# Patient Record
Sex: MALE | Race: WHITE | NOT HISPANIC OR LATINO | Employment: OTHER | URBAN - METROPOLITAN AREA
[De-identification: names, ages, dates, MRNs, and addresses within clinical notes are randomized per-mention and may not be internally consistent; named-entity substitution may affect disease eponyms.]

---

## 2023-05-13 LAB
ALANINE AMINOTRANSFERASE (SGPT) (U/L) IN SER/PLAS: 17 U/L (ref 10–52)
ALBUMIN (G/DL) IN SER/PLAS: 3.8 G/DL (ref 3.4–5)
ALKALINE PHOSPHATASE (U/L) IN SER/PLAS: 61 U/L (ref 33–136)
AMPHETAMINE (PRESENCE) IN URINE BY SCREEN METHOD: ABNORMAL
ANION GAP IN SER/PLAS: 13 MMOL/L (ref 10–20)
ASPARTATE AMINOTRANSFERASE (SGOT) (U/L) IN SER/PLAS: 19 U/L (ref 9–39)
BARBITURATES PRESENCE IN URINE BY SCREEN METHOD: ABNORMAL
BASOPHILS (10*3/UL) IN BLOOD BY AUTOMATED COUNT: 0.07 X10E9/L (ref 0–0.1)
BASOPHILS/100 LEUKOCYTES IN BLOOD BY AUTOMATED COUNT: 0.6 % (ref 0–2)
BENZODIAZEPINE (PRESENCE) IN URINE BY SCREEN METHOD: ABNORMAL
BILIRUBIN TOTAL (MG/DL) IN SER/PLAS: 0.9 MG/DL (ref 0–1.2)
CALCIDIOL (25 OH VITAMIN D3) (NG/ML) IN SER/PLAS: 32 NG/ML
CALCIUM (MG/DL) IN SER/PLAS: 8.9 MG/DL (ref 8.6–10.3)
CANNABINOIDS IN URINE BY SCREEN METHOD: ABNORMAL
CARBON DIOXIDE, TOTAL (MMOL/L) IN SER/PLAS: 28 MMOL/L (ref 21–32)
CHLORIDE (MMOL/L) IN SER/PLAS: 101 MMOL/L (ref 98–107)
CHOLESTEROL (MG/DL) IN SER/PLAS: 114 MG/DL (ref 0–199)
CHOLESTEROL IN HDL (MG/DL) IN SER/PLAS: 52.3 MG/DL
CHOLESTEROL/HDL RATIO: 2.2
COCAINE (PRESENCE) IN URINE BY SCREEN METHOD: ABNORMAL
CREATININE (MG/DL) IN SER/PLAS: 0.81 MG/DL (ref 0.5–1.3)
DRUG SCREEN COMMENT URINE: ABNORMAL
EOSINOPHILS (10*3/UL) IN BLOOD BY AUTOMATED COUNT: 0.2 X10E9/L (ref 0–0.4)
EOSINOPHILS/100 LEUKOCYTES IN BLOOD BY AUTOMATED COUNT: 1.8 % (ref 0–6)
ERYTHROCYTE DISTRIBUTION WIDTH (RATIO) BY AUTOMATED COUNT: 14.6 % (ref 11.5–14.5)
ERYTHROCYTE MEAN CORPUSCULAR HEMOGLOBIN CONCENTRATION (G/DL) BY AUTOMATED: 32.1 G/DL (ref 32–36)
ERYTHROCYTE MEAN CORPUSCULAR VOLUME (FL) BY AUTOMATED COUNT: 95 FL (ref 80–100)
ERYTHROCYTES (10*6/UL) IN BLOOD BY AUTOMATED COUNT: 4.02 X10E12/L (ref 4.5–5.9)
FENTANYL URINE: ABNORMAL
GFR MALE: 89 ML/MIN/1.73M2
GLUCOSE (MG/DL) IN SER/PLAS: 96 MG/DL (ref 74–99)
HEMATOCRIT (%) IN BLOOD BY AUTOMATED COUNT: 38 % (ref 41–52)
HEMOGLOBIN (G/DL) IN BLOOD: 12.2 G/DL (ref 13.5–17.5)
IMMATURE GRANULOCYTES/100 LEUKOCYTES IN BLOOD BY AUTOMATED COUNT: 0.5 % (ref 0–0.9)
LDL: 51 MG/DL (ref 0–99)
LEUKOCYTES (10*3/UL) IN BLOOD BY AUTOMATED COUNT: 11.4 X10E9/L (ref 4.4–11.3)
LYMPHOCYTES (10*3/UL) IN BLOOD BY AUTOMATED COUNT: 2.42 X10E9/L (ref 0.8–3)
LYMPHOCYTES/100 LEUKOCYTES IN BLOOD BY AUTOMATED COUNT: 21.2 % (ref 13–44)
METHADONE (PRESENCE) IN URINE BY SCREEN METHOD: ABNORMAL
MONOCYTES (10*3/UL) IN BLOOD BY AUTOMATED COUNT: 1.05 X10E9/L (ref 0.05–0.8)
MONOCYTES/100 LEUKOCYTES IN BLOOD BY AUTOMATED COUNT: 9.2 % (ref 2–10)
NEUTROPHILS (10*3/UL) IN BLOOD BY AUTOMATED COUNT: 7.62 X10E9/L (ref 1.6–5.5)
NEUTROPHILS/100 LEUKOCYTES IN BLOOD BY AUTOMATED COUNT: 66.7 % (ref 40–80)
OPIATES (PRESENCE) IN URINE BY SCREEN METHOD: ABNORMAL
OXYCODONE (PRESENCE) IN URINE BY SCREEN METHOD: ABNORMAL
PHENCYCLIDINE (PRESENCE) IN URINE BY SCREEN METHOD: ABNORMAL
PLATELETS (10*3/UL) IN BLOOD AUTOMATED COUNT: 292 X10E9/L (ref 150–450)
POTASSIUM (MMOL/L) IN SER/PLAS: 3.9 MMOL/L (ref 3.5–5.3)
PROTEIN TOTAL: 7.2 G/DL (ref 6.4–8.2)
SODIUM (MMOL/L) IN SER/PLAS: 138 MMOL/L (ref 136–145)
THYROTROPIN (MIU/L) IN SER/PLAS BY DETECTION LIMIT <= 0.05 MIU/L: 2.23 MIU/L (ref 0.44–3.98)
TRIGLYCERIDE (MG/DL) IN SER/PLAS: 56 MG/DL (ref 0–149)
UREA NITROGEN (MG/DL) IN SER/PLAS: 15 MG/DL (ref 6–23)
VLDL: 11 MG/DL (ref 0–40)

## 2023-05-14 LAB
ESTIMATED AVERAGE GLUCOSE FOR HBA1C: 120 MG/DL
HEMOGLOBIN A1C/HEMOGLOBIN TOTAL IN BLOOD: 5.8 %

## 2023-05-19 LAB
6-ACETYLMORPHINE: <25 NG/ML
CODEINE: <50 NG/ML
HYDROCODONE: <25 NG/ML
HYDROMORPHONE: <25 NG/ML
MORPHINE URINE: <50 NG/ML
NORHYDROCODONE: <25 NG/ML
NOROXYCODONE: 37 NG/ML
OXYCODONE: <25 NG/ML
OXYMORPHONE: 101 NG/ML

## 2023-10-05 ENCOUNTER — LAB (OUTPATIENT)
Dept: LAB | Facility: LAB | Age: 83
End: 2023-10-05
Payer: MEDICARE

## 2023-10-05 DIAGNOSIS — I25.10 ATHEROSCLEROTIC HEART DISEASE OF NATIVE CORONARY ARTERY WITHOUT ANGINA PECTORIS: ICD-10-CM

## 2023-10-05 DIAGNOSIS — J43.9 PULMONARY EMPHYSEMA, UNSPECIFIED EMPHYSEMA TYPE (MULTI): Primary | ICD-10-CM

## 2023-10-05 DIAGNOSIS — D64.9 ANEMIA, UNSPECIFIED TYPE: ICD-10-CM

## 2023-10-05 DIAGNOSIS — R73.09 OTHER ABNORMAL GLUCOSE: Primary | ICD-10-CM

## 2023-10-05 DIAGNOSIS — E55.9 VITAMIN D DEFICIENCY, UNSPECIFIED: ICD-10-CM

## 2023-10-05 DIAGNOSIS — D64.9 ANEMIA, UNSPECIFIED: ICD-10-CM

## 2023-10-05 LAB
25(OH)D3 SERPL-MCNC: 33 NG/ML (ref 30–100)
BASOPHILS # BLD AUTO: 0.07 X10*3/UL (ref 0–0.1)
BASOPHILS NFR BLD AUTO: 0.5 %
CHOLEST SERPL-MCNC: 101 MG/DL (ref 0–199)
CHOLESTEROL/HDL RATIO: 2.2
EOSINOPHIL # BLD AUTO: 0.23 X10*3/UL (ref 0–0.4)
EOSINOPHIL NFR BLD AUTO: 1.7 %
ERYTHROCYTE [DISTWIDTH] IN BLOOD BY AUTOMATED COUNT: 13.2 % (ref 11.5–14.5)
EST. AVERAGE GLUCOSE BLD GHB EST-MCNC: 123 MG/DL
HBA1C MFR BLD: 5.9 %
HCT VFR BLD AUTO: 44.1 % (ref 41–52)
HDLC SERPL-MCNC: 45.6 MG/DL
HGB BLD-MCNC: 15.1 G/DL (ref 13.5–17.5)
IMM GRANULOCYTES # BLD AUTO: 0.06 X10*3/UL (ref 0–0.5)
IMM GRANULOCYTES NFR BLD AUTO: 0.4 % (ref 0–0.9)
LDLC SERPL CALC-MCNC: 37 MG/DL (ref 140–190)
LYMPHOCYTES # BLD AUTO: 4.35 X10*3/UL (ref 0.8–3)
LYMPHOCYTES NFR BLD AUTO: 32 %
MCH RBC QN AUTO: 31.5 PG (ref 26–34)
MCHC RBC AUTO-ENTMCNC: 34.2 G/DL (ref 32–36)
MCV RBC AUTO: 92 FL (ref 80–100)
MONOCYTES # BLD AUTO: 1.57 X10*3/UL (ref 0.05–0.8)
MONOCYTES NFR BLD AUTO: 11.5 %
NEUTROPHILS # BLD AUTO: 7.33 X10*3/UL (ref 1.6–5.5)
NEUTROPHILS NFR BLD AUTO: 53.9 %
NON HDL CHOLESTEROL: 55 MG/DL (ref 0–149)
NRBC BLD-RTO: 0 /100 WBCS (ref 0–0)
PLATELET # BLD AUTO: 226 X10*3/UL (ref 150–450)
PMV BLD AUTO: 10.6 FL (ref 7.5–11.5)
RBC # BLD AUTO: 4.8 X10*6/UL (ref 4.5–5.9)
TRIGL SERPL-MCNC: 90 MG/DL (ref 0–149)
VLDL: 18 MG/DL (ref 0–40)
WBC # BLD AUTO: 13.6 X10*3/UL (ref 4.4–11.3)

## 2023-10-05 PROCEDURE — 85025 COMPLETE CBC W/AUTO DIFF WBC: CPT

## 2023-10-05 PROCEDURE — 83036 HEMOGLOBIN GLYCOSYLATED A1C: CPT

## 2023-10-05 PROCEDURE — 80061 LIPID PANEL: CPT

## 2023-10-05 PROCEDURE — 82306 VITAMIN D 25 HYDROXY: CPT

## 2023-10-05 PROCEDURE — 36415 COLL VENOUS BLD VENIPUNCTURE: CPT

## 2023-10-05 NOTE — RESULT ENCOUNTER NOTE
Labs acceptable but there are a few abnormalities similar to previous labs.  Please advise patient to continue the same medications and follow-up to review in detail and discuss the management options.

## 2023-10-06 DIAGNOSIS — J43.9 PULMONARY EMPHYSEMA, UNSPECIFIED EMPHYSEMA TYPE (MULTI): ICD-10-CM

## 2023-10-06 RX ORDER — FERROUS SULFATE TAB 325 MG (65 MG ELEMENTAL FE) 325 (65 FE) MG
1 TAB ORAL DAILY
Qty: 90 TABLET | Refills: 1 | Status: SHIPPED | OUTPATIENT
Start: 2023-10-06

## 2023-10-06 RX ORDER — FLUTICASONE PROPIONATE AND SALMETEROL 250; 50 UG/1; UG/1
1 POWDER RESPIRATORY (INHALATION) EVERY 12 HOURS
Qty: 60 EACH | Refills: 1 | Status: SHIPPED | OUTPATIENT
Start: 2023-10-06 | End: 2023-10-09

## 2023-10-09 RX ORDER — FLUTICASONE PROPIONATE AND SALMETEROL 250; 50 UG/1; UG/1
1 POWDER RESPIRATORY (INHALATION) EVERY 12 HOURS
Qty: 180 EACH | Refills: 1 | Status: SHIPPED | OUTPATIENT
Start: 2023-10-09

## 2023-10-11 PROBLEM — M47.812 CERVICAL OSTEOARTHRITIS: Status: ACTIVE | Noted: 2023-10-11

## 2023-10-11 PROBLEM — L91.8 SKIN TAG: Status: ACTIVE | Noted: 2023-10-11

## 2023-10-11 PROBLEM — E55.9 VITAMIN D DEFICIENCY: Status: ACTIVE | Noted: 2023-10-11

## 2023-10-11 PROBLEM — D62 ACUTE BLOOD LOSS ANEMIA: Status: ACTIVE | Noted: 2021-06-01

## 2023-10-11 PROBLEM — R68.81 EARLY SATIETY: Status: ACTIVE | Noted: 2023-10-11

## 2023-10-11 PROBLEM — R63.4 WEIGHT LOSS, UNINTENTIONAL: Status: ACTIVE | Noted: 2023-10-11

## 2023-10-11 PROBLEM — M79.671 ISCHEMIC PAIN OF RIGHT FOOT: Status: ACTIVE | Noted: 2023-10-11

## 2023-10-11 PROBLEM — I70.219: Status: ACTIVE | Noted: 2019-07-23

## 2023-10-11 PROBLEM — R73.9 HYPERGLYCEMIA: Status: ACTIVE | Noted: 2023-10-11

## 2023-10-11 PROBLEM — I42.9 CARDIOMYOPATHY (MULTI): Status: ACTIVE | Noted: 2023-10-11

## 2023-10-11 PROBLEM — I99.8 ISCHEMIC PAIN OF RIGHT FOOT: Status: ACTIVE | Noted: 2023-10-11

## 2023-10-11 PROBLEM — I25.84 CORONARY ARTERY CALCIFICATION: Status: ACTIVE | Noted: 2023-10-11

## 2023-10-11 PROBLEM — M25.551 RIGHT HIP PAIN: Status: ACTIVE | Noted: 2020-05-05

## 2023-10-11 PROBLEM — I65.23 BILATERAL CAROTID ARTERY STENOSIS: Status: ACTIVE | Noted: 2023-04-19

## 2023-10-11 PROBLEM — R07.89 CHEST DISCOMFORT: Status: ACTIVE | Noted: 2022-12-12

## 2023-10-11 PROBLEM — R73.09 ELEVATED HEMOGLOBIN A1C: Status: ACTIVE | Noted: 2023-10-11

## 2023-10-11 PROBLEM — G89.29 CHRONIC LOWER LIMB PAIN: Status: ACTIVE | Noted: 2023-10-11

## 2023-10-11 PROBLEM — M79.606 CHRONIC LOWER LIMB PAIN: Status: ACTIVE | Noted: 2023-10-11

## 2023-10-11 PROBLEM — M85.80 OSTEOPENIA: Status: ACTIVE | Noted: 2023-10-11

## 2023-10-11 PROBLEM — I10 ESSENTIAL HYPERTENSION, BENIGN: Status: ACTIVE | Noted: 2023-10-11

## 2023-10-11 PROBLEM — I73.9 PVD (PERIPHERAL VASCULAR DISEASE) (CMS-HCC): Status: ACTIVE | Noted: 2023-10-11

## 2023-10-11 PROBLEM — I72.4: Status: ACTIVE | Noted: 2023-04-26

## 2023-10-11 PROBLEM — K75.3 GRANULOMA OF LIVER: Status: ACTIVE | Noted: 2023-10-11

## 2023-10-11 PROBLEM — Z95.1 S/P CABG (CORONARY ARTERY BYPASS GRAFT): Status: RESOLVED | Noted: 2023-01-09 | Resolved: 2023-10-11

## 2023-10-11 PROBLEM — I25.10 CORONARY ARTERY CALCIFICATION: Status: ACTIVE | Noted: 2023-10-11

## 2023-10-11 PROBLEM — I70.90 ARTERIOSCLEROSIS OBLITERANS: Status: ACTIVE | Noted: 2023-10-11

## 2023-10-11 PROBLEM — S88.119A AMPUTATION BELOW KNEE (MULTI): Status: ACTIVE | Noted: 2023-10-11

## 2023-10-11 PROBLEM — G89.18 POST-OPERATIVE PAIN: Status: ACTIVE | Noted: 2023-04-27

## 2023-10-11 RX ORDER — METOPROLOL TARTRATE 25 MG/1
25 TABLET, FILM COATED ORAL 2 TIMES DAILY
COMMUNITY
End: 2023-10-12 | Stop reason: SDUPTHER

## 2023-10-11 RX ORDER — POLYETHYLENE GLYCOL 3350 17 G/17G
17 POWDER, FOR SOLUTION ORAL
COMMUNITY
Start: 2021-06-02 | End: 2023-10-12 | Stop reason: ALTCHOICE

## 2023-10-11 RX ORDER — METOPROLOL SUCCINATE 100 MG/1
1 TABLET, EXTENDED RELEASE ORAL DAILY
COMMUNITY
Start: 2021-06-23 | End: 2023-10-12 | Stop reason: SDUPTHER

## 2023-10-11 RX ORDER — DOCUSATE SODIUM 100 MG/1
100 CAPSULE, LIQUID FILLED ORAL 2 TIMES DAILY
COMMUNITY
End: 2023-12-22 | Stop reason: SDUPTHER

## 2023-10-11 RX ORDER — CEPHALEXIN 500 MG/1
500 CAPSULE ORAL 3 TIMES DAILY
COMMUNITY
Start: 2023-05-15 | End: 2023-10-12 | Stop reason: ALTCHOICE

## 2023-10-11 RX ORDER — AMIODARONE HYDROCHLORIDE 200 MG/1
200 TABLET ORAL 2 TIMES DAILY
COMMUNITY
Start: 2023-02-09

## 2023-10-11 RX ORDER — FUROSEMIDE 20 MG/1
20 TABLET ORAL DAILY
COMMUNITY
Start: 2023-01-09 | End: 2023-10-12 | Stop reason: ALTCHOICE

## 2023-10-11 RX ORDER — OXYCODONE AND ACETAMINOPHEN 5; 325 MG/1; MG/1
1 TABLET ORAL
COMMUNITY
Start: 2023-05-10 | End: 2023-10-12 | Stop reason: SDUPTHER

## 2023-10-11 RX ORDER — SODIUM CHLORIDE 0.9 % (FLUSH) 0.9 %
10 SYRINGE (ML) INJECTION
COMMUNITY
Start: 2022-12-19 | End: 2023-10-12 | Stop reason: ALTCHOICE

## 2023-10-11 RX ORDER — ATORVASTATIN CALCIUM 40 MG/1
40 TABLET, FILM COATED ORAL
COMMUNITY
Start: 2017-05-02 | End: 2024-04-17

## 2023-10-11 RX ORDER — LISINOPRIL 10 MG/1
1 TABLET ORAL DAILY
COMMUNITY
End: 2023-10-12 | Stop reason: SDUPTHER

## 2023-10-11 RX ORDER — ASPIRIN 81 MG/1
81 TABLET ORAL
COMMUNITY
Start: 2020-09-21

## 2023-10-11 RX ORDER — CHOLECALCIFEROL (VITAMIN D3) 50 MCG
1 TABLET ORAL
COMMUNITY
Start: 2022-02-04

## 2023-10-11 RX ORDER — RIVAROXABAN 20 MG/1
20 TABLET, FILM COATED ORAL EVERY EVENING
COMMUNITY
End: 2024-02-05 | Stop reason: SDUPTHER

## 2023-10-12 ENCOUNTER — OFFICE VISIT (OUTPATIENT)
Dept: PRIMARY CARE | Facility: CLINIC | Age: 83
End: 2023-10-12
Payer: MEDICARE

## 2023-10-12 VITALS
HEIGHT: 72 IN | HEART RATE: 76 BPM | WEIGHT: 181.8 LBS | BODY MASS INDEX: 24.62 KG/M2 | DIASTOLIC BLOOD PRESSURE: 109 MMHG | TEMPERATURE: 97.8 F | SYSTOLIC BLOOD PRESSURE: 163 MMHG

## 2023-10-12 DIAGNOSIS — I70.219 ATHEROSCLEROTIC PERIPHERAL VASCULAR DISEASE WITH INTERMITTENT CLAUDICATION (CMS-HCC): ICD-10-CM

## 2023-10-12 DIAGNOSIS — I10 ESSENTIAL HYPERTENSION, BENIGN: ICD-10-CM

## 2023-10-12 DIAGNOSIS — E78.5 DYSLIPIDEMIA: ICD-10-CM

## 2023-10-12 DIAGNOSIS — I10 PRIMARY HYPERTENSION: ICD-10-CM

## 2023-10-12 DIAGNOSIS — I48.11 LONGSTANDING PERSISTENT ATRIAL FIBRILLATION (MULTI): ICD-10-CM

## 2023-10-12 DIAGNOSIS — R73.09 ELEVATED HEMOGLOBIN A1C: ICD-10-CM

## 2023-10-12 DIAGNOSIS — I74.3 EMBOLISM AND THROMBOSIS OF ARTERIES OF THE LOWER EXTREMITIES (MULTI): Primary | ICD-10-CM

## 2023-10-12 DIAGNOSIS — I25.5 ISCHEMIC CARDIOMYOPATHY: ICD-10-CM

## 2023-10-12 DIAGNOSIS — I42.0 ISCHEMIC DILATED CARDIOMYOPATHY (MULTI): ICD-10-CM

## 2023-10-12 DIAGNOSIS — I25.5 ISCHEMIC DILATED CARDIOMYOPATHY (MULTI): ICD-10-CM

## 2023-10-12 DIAGNOSIS — J20.9 ACUTE BRONCHITIS, UNSPECIFIED ORGANISM: ICD-10-CM

## 2023-10-12 DIAGNOSIS — I73.9 CLAUDICATION OF RIGHT LOWER EXTREMITY (CMS-HCC): ICD-10-CM

## 2023-10-12 DIAGNOSIS — S88.119A AMPUTATION BELOW KNEE (MULTI): ICD-10-CM

## 2023-10-12 DIAGNOSIS — J42 CHRONIC BRONCHITIS, UNSPECIFIED CHRONIC BRONCHITIS TYPE (MULTI): ICD-10-CM

## 2023-10-12 PROCEDURE — 3077F SYST BP >= 140 MM HG: CPT | Performed by: INTERNAL MEDICINE

## 2023-10-12 PROCEDURE — 1159F MED LIST DOCD IN RCRD: CPT | Performed by: INTERNAL MEDICINE

## 2023-10-12 PROCEDURE — 3080F DIAST BP >= 90 MM HG: CPT | Performed by: INTERNAL MEDICINE

## 2023-10-12 PROCEDURE — 1160F RVW MEDS BY RX/DR IN RCRD: CPT | Performed by: INTERNAL MEDICINE

## 2023-10-12 PROCEDURE — 99214 OFFICE O/P EST MOD 30 MIN: CPT | Performed by: INTERNAL MEDICINE

## 2023-10-12 PROCEDURE — 1036F TOBACCO NON-USER: CPT | Performed by: INTERNAL MEDICINE

## 2023-10-12 RX ORDER — AZITHROMYCIN 500 MG/1
500 TABLET, FILM COATED ORAL DAILY
Qty: 5 TABLET | Refills: 0 | Status: SHIPPED | OUTPATIENT
Start: 2023-10-12 | End: 2023-10-17

## 2023-10-12 RX ORDER — LISINOPRIL 10 MG/1
10 TABLET ORAL DAILY
Qty: 90 TABLET | Refills: 1 | Status: SHIPPED | OUTPATIENT
Start: 2023-10-12

## 2023-10-12 RX ORDER — METOPROLOL TARTRATE 25 MG/1
50 TABLET, FILM COATED ORAL 2 TIMES DAILY
Qty: 120 TABLET | Refills: 0
Start: 2023-10-12 | End: 2023-10-17

## 2023-10-12 RX ORDER — OXYCODONE AND ACETAMINOPHEN 5; 325 MG/1; MG/1
1 TABLET ORAL EVERY 6 HOURS PRN
Qty: 100 TABLET | Refills: 0 | Status: SHIPPED | OUTPATIENT
Start: 2023-10-12 | End: 2024-01-02 | Stop reason: ALTCHOICE

## 2023-10-12 RX ORDER — LISINOPRIL 10 MG/1
10 TABLET ORAL DAILY
Qty: 30 TABLET | Refills: 2 | Status: SHIPPED | OUTPATIENT
Start: 2023-10-12 | End: 2023-10-12

## 2023-10-12 ASSESSMENT — PATIENT HEALTH QUESTIONNAIRE - PHQ9
1. LITTLE INTEREST OR PLEASURE IN DOING THINGS: NOT AT ALL
2. FEELING DOWN, DEPRESSED OR HOPELESS: NOT AT ALL
SUM OF ALL RESPONSES TO PHQ9 QUESTIONS 1 AND 2: 0

## 2023-10-12 ASSESSMENT — COLUMBIA-SUICIDE SEVERITY RATING SCALE - C-SSRS
2. HAVE YOU ACTUALLY HAD ANY THOUGHTS OF KILLING YOURSELF?: NO
6. HAVE YOU EVER DONE ANYTHING, STARTED TO DO ANYTHING, OR PREPARED TO DO ANYTHING TO END YOUR LIFE?: NO
1. IN THE PAST MONTH, HAVE YOU WISHED YOU WERE DEAD OR WISHED YOU COULD GO TO SLEEP AND NOT WAKE UP?: NO

## 2023-10-12 ASSESSMENT — ENCOUNTER SYMPTOMS
DEPRESSION: 0
LOSS OF SENSATION IN FEET: 1
OCCASIONAL FEELINGS OF UNSTEADINESS: 0

## 2023-10-12 NOTE — PROGRESS NOTES
Assessment/Plan   83 years old male with multiple chronic medical problems including coronary artery disease chronic atrial fibrillation hypertension has evidence of acute bronchitis on this visit.  He has COPD.  He understands the importance of continuing the inhalers and will start him on empiric antibiotic therapy.  Patient has mild leukocytosis during his recent lab testing and this will be further followed up in 2 weeks time.  Patient has had recent chest x-ray and he does not want to do any repeat chest x-ray.    Patient has elevated blood pressure and he used to be on lisinopril and also higher dose of metoprolol.  He says his cardiologist change his medications.  Because of his significant elevated blood pressure it was decided that he will be restarted on lisinopril and increased dose of metoprolol.    Patient has atrial fibrillation and on my exam today his heart rate is irregularly irregular.  Patient was advised to increase the dose of metoprolol and follow-up with cardiologist to discuss about the amiodarone.    Patient's other chronic problems are reviewed and discussed.  He did not have any renal function done recently even though he has had a blood test done.  He will have a CMP and a CBC prior to his next visit in 2 weeks time.    Patient was advised to get the home blood pressure readings during his next visit.    He will get the flu vaccine if he is doing better during his next visit prior to he leaves [place] for his winter months.      Problem List Items Addressed This Visit       Amputation below knee (CMS/HCC)    Relevant Medications    oxyCODONE-acetaminophen (Percocet) 5-325 mg tablet    Atrial fibrillation (CMS/HCC)    Relevant Medications    metoprolol tartrate (Lopressor) 25 mg tablet    Cardiomyopathy (CMS/HCC)    Relevant Medications    metoprolol tartrate (Lopressor) 25 mg tablet    Chronic obstructive pulmonary disease (CMS/HCC)    Claudication of right lower extremity (CMS/HCC)     Relevant Medications    oxyCODONE-acetaminophen (Percocet) 5-325 mg tablet    Dyslipidemia    Elevated hemoglobin A1c    Essential hypertension, benign    Relevant Medications    lisinopril 10 mg tablet    metoprolol tartrate (Lopressor) 25 mg tablet    Other Relevant Orders    CBC and Auto Differential    Ischemic dilated cardiomyopathy (CMS/HCC)    Relevant Medications    metoprolol tartrate (Lopressor) 25 mg tablet    oxyCODONE-acetaminophen (Percocet) 5-325 mg tablet    Atherosclerotic peripheral vascular disease with intermittent claudication (CMS/HCC)    Relevant Orders    Comprehensive Metabolic Panel    HTN (hypertension)    Relevant Orders    Comprehensive Metabolic Panel    CBC and Auto Differential    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 male who presents for Follow-up.    History of present illness  83 years old male with multiple chronic medical problems including coronary artery disease chronic atrial fibrillation has had recent open heart surgery at [facility] has significant peripheral artery disease for which she had several surgeries came for a follow-up visit.    Patient's regular cardiologist has retired and patient is now following up with the [facility] cardiologist.    Patient has chronic pain in his right leg due to peripheral artery disease and extensive surgeries have not helped him and he wants to take the pain medication and he wants a refill.    Patient has changed his medication but he says most of these medicines were stopped by the cardiologist he has no pending appointment with the cardiologist.    Patient complains of cough congestion for the last 4 to 5 days.  Denies any exposure to COVID.  Has some scanty productive  sputum.  He is an ex-smoker.    Patient denies any headache no history of any recent falls.  He has had blood test done which are reviewed.      ROS  Denies any chest pain at rest.  Has chronic claudication of the right leg but denies any resting pain.  No urine bowel changes.  Family History   Problem Relation Name Age of Onset    Cancer Mother        Social History     Socioeconomic History    Marital status:      Spouse name: Not on file    Number of children: Not on file    Years of education: Not on file    Highest education level: Not on file   Occupational History    Not on file   Tobacco Use    Smoking status: Former     Packs/day: .25     Types: Cigarettes    Smokeless tobacco: Never   Substance and Sexual Activity    Alcohol use: Not Currently     Comment: occassion    Drug use: Never    Sexual activity: Not on file   Other Topics Concern    Not on file   Social History Narrative    Not on file     Social Determinants of Health     Financial Resource Strain: Not on file   Food Insecurity: Not on file   Transportation Needs: Not on file   Physical Activity: Not on file   Stress: Not on file   Social Connections: Not on file   Intimate Partner Violence: Not on file   Housing Stability: Not on file      Patient has no known allergies.   Current Outpatient Medications   Medication Sig Dispense Refill    aspirin 81 mg EC tablet Take 1 tablet (81 mg) by mouth once daily.      atorvastatin (Lipitor) 40 mg tablet Take 1 tablet (40 mg) by mouth once daily.      cholecalciferol (Vitamin D-3) 50 MCG (2000 UT) tablet Take 1 tablet (50 mcg) by mouth once daily.      docusate sodium (Colace) 100 mg capsule Take 1 capsule (100 mg) by mouth 2 times a day.      FeroSuL 325 mg (65 mg iron) tablet TAKE 1 TABLET BY MOUTH DAILY AS DIRECTED 90 tablet 1    fluticasone propion-salmeteroL (Advair Diskus) 250-50 mcg/dose diskus inhaler INHALE 1 PUFF BY MOUTH EVERY 12 HOURS 180 each 1    Xarelto 20 mg tablet Take 1 tablet (20  mg) by mouth once daily in the evening.      amiodarone (Pacerone) 200 mg tablet Take 1 tablet (200 mg) by mouth once daily.      azithromycin (Zithromax) 500 mg tablet Take 1 tablet (500 mg) by mouth once daily for 5 days. 5 tablet 0    lisinopril 10 mg tablet Take 1 tablet (10 mg) by mouth once daily. 30 tablet 2    metoprolol tartrate (Lopressor) 25 mg tablet Take 2 tablets (50 mg) by mouth 2 times a day. 120 tablet 0    oxyCODONE-acetaminophen (Percocet) 5-325 mg tablet Take 1 tablet by mouth every 6 hours if needed for severe pain (7 - 10) (pain in the leg). 100 tablet 0     No current facility-administered medications for this visit.            Objective     Vitals:    10/12/23 0842   BP: (!) 163/109   Pulse: 76   Temp: 36.6 °C (97.8 °F)        Physical Exam  Normal-built, well-nourished with no apparent distress. Alert oriented  Skin: Normal turgor. No rash.  Head: Normocephalic, atraumatic.  Eyes: Pupils are equal, round,.  No pallor of conjunctivae.  Mucous membranes are moist  Neck: Supple. No JVD. No carotid bruit. No thyromegaly. No cervical lymphadenopathy.  No clubbing, has significant peripheral osteoarthritis noted,   Chest: Vesicular breathing Bilaterally moderate air entry and diffuse wheezing.  Rare crackles.  Heart: Irregular rate and rhythm controlled. S1, S2 positive.   Abdomen: Soft and nontender. Bowel sounds are positive. No organomegaly.  Extremities: Right leg no pedal pitting edema. Left leg has amputation with a prosthesis.  Comfortable with the prosthesis and walks well.  Right leg pedal pulses are diminished but no evidence of acute ischemia  Neuro Exam: No focal signs. Able to walk with the prostheses independently.      Problem List Items Addressed This Visit       Amputation below knee (CMS/HCC)    Relevant Medications    oxyCODONE-acetaminophen (Percocet) 5-325 mg tablet    Atrial fibrillation (CMS/HCC)    Relevant Medications    metoprolol tartrate (Lopressor) 25 mg tablet     Cardiomyopathy (CMS/HCC)    Relevant Medications    metoprolol tartrate (Lopressor) 25 mg tablet    Chronic obstructive pulmonary disease (CMS/HCC)    Claudication of right lower extremity (CMS/HCC)    Relevant Medications    oxyCODONE-acetaminophen (Percocet) 5-325 mg tablet    Dyslipidemia    Elevated hemoglobin A1c    Essential hypertension, benign    Relevant Medications    lisinopril 10 mg tablet    metoprolol tartrate (Lopressor) 25 mg tablet    Other Relevant Orders    CBC and Auto Differential    Ischemic dilated cardiomyopathy (CMS/HCC)    Relevant Medications    metoprolol tartrate (Lopressor) 25 mg tablet    oxyCODONE-acetaminophen (Percocet) 5-325 mg tablet    Atherosclerotic peripheral vascular disease with intermittent claudication (CMS/HCC)    Relevant Orders    Comprehensive Metabolic Panel    HTN (hypertension)    Relevant Orders    Comprehensive Metabolic Panel    CBC and Auto Differential    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) - Primary     Other Visit Diagnoses       Acute bronchitis, unspecified organism        Relevant Medications    azithromycin (Zithromax) 500 mg tablet             Orders Placed This Encounter   Procedures    Comprehensive Metabolic Panel     Standing Status:   Future     Standing Expiration Date:   10/12/2024     Order Specific Question:   Release result to [facility]     Answer:   Immediate    CBC and Auto Differential     Standing Status:   Future     Standing Expiration Date:   10/12/2024     Order Specific Question:   Release result to [facility]     Answer:   Immediate        Lab Results   Component Value Date    CHOL 101 10/05/2023    LDLCALC 37 (L) 10/05/2023    CHHDL 2.2 10/05/2023

## 2023-10-15 DIAGNOSIS — I10 ESSENTIAL HYPERTENSION, BENIGN: ICD-10-CM

## 2023-10-17 RX ORDER — METOPROLOL TARTRATE 25 MG/1
50 TABLET, FILM COATED ORAL 2 TIMES DAILY
Qty: 360 TABLET | Refills: 0 | Status: SHIPPED | OUTPATIENT
Start: 2023-10-17 | End: 2023-12-13

## 2023-10-25 ENCOUNTER — APPOINTMENT (OUTPATIENT)
Dept: PRIMARY CARE | Facility: CLINIC | Age: 83
End: 2023-10-25
Payer: MEDICARE

## 2023-10-25 ENCOUNTER — LAB (OUTPATIENT)
Dept: LAB | Facility: LAB | Age: 83
End: 2023-10-25
Payer: MEDICARE

## 2023-10-25 DIAGNOSIS — I25.118 ATHEROSCLEROTIC HEART DISEASE OF NATIVE CORONARY ARTERY WITH OTHER FORMS OF ANGINA PECTORIS (CMS-HCC): ICD-10-CM

## 2023-10-25 DIAGNOSIS — R53.83 OTHER FATIGUE: ICD-10-CM

## 2023-10-25 DIAGNOSIS — I48.0 PAROXYSMAL ATRIAL FIBRILLATION (MULTI): Primary | ICD-10-CM

## 2023-10-26 ENCOUNTER — OFFICE VISIT (OUTPATIENT)
Dept: PRIMARY CARE | Facility: CLINIC | Age: 83
End: 2023-10-26
Payer: MEDICARE

## 2023-10-26 VITALS
SYSTOLIC BLOOD PRESSURE: 114 MMHG | DIASTOLIC BLOOD PRESSURE: 77 MMHG | HEIGHT: 69 IN | TEMPERATURE: 97.5 F | WEIGHT: 182.2 LBS | BODY MASS INDEX: 26.98 KG/M2 | HEART RATE: 76 BPM

## 2023-10-26 DIAGNOSIS — I73.9 PVD (PERIPHERAL VASCULAR DISEASE) (CMS-HCC): ICD-10-CM

## 2023-10-26 DIAGNOSIS — I10 PRIMARY HYPERTENSION: ICD-10-CM

## 2023-10-26 DIAGNOSIS — I25.5 ISCHEMIC CARDIOMYOPATHY: ICD-10-CM

## 2023-10-26 DIAGNOSIS — I48.0 PAROXYSMAL ATRIAL FIBRILLATION (MULTI): Primary | ICD-10-CM

## 2023-10-26 DIAGNOSIS — I73.9 CLAUDICATION OF RIGHT LOWER EXTREMITY (CMS-HCC): ICD-10-CM

## 2023-10-26 DIAGNOSIS — I70.90 ARTERIOSCLEROSIS OBLITERANS: ICD-10-CM

## 2023-10-26 DIAGNOSIS — R73.9 HYPERGLYCEMIA: ICD-10-CM

## 2023-10-26 DIAGNOSIS — I10 ESSENTIAL HYPERTENSION, BENIGN: ICD-10-CM

## 2023-10-26 DIAGNOSIS — I65.23 BILATERAL CAROTID ARTERY STENOSIS: ICD-10-CM

## 2023-10-26 DIAGNOSIS — J42 CHRONIC BRONCHITIS, UNSPECIFIED CHRONIC BRONCHITIS TYPE (MULTI): ICD-10-CM

## 2023-10-26 DIAGNOSIS — E78.5 DYSLIPIDEMIA: ICD-10-CM

## 2023-10-26 PROCEDURE — 1160F RVW MEDS BY RX/DR IN RCRD: CPT | Performed by: INTERNAL MEDICINE

## 2023-10-26 PROCEDURE — 3078F DIAST BP <80 MM HG: CPT | Performed by: INTERNAL MEDICINE

## 2023-10-26 PROCEDURE — 1159F MED LIST DOCD IN RCRD: CPT | Performed by: INTERNAL MEDICINE

## 2023-10-26 PROCEDURE — 1036F TOBACCO NON-USER: CPT | Performed by: INTERNAL MEDICINE

## 2023-10-26 PROCEDURE — 99213 OFFICE O/P EST LOW 20 MIN: CPT | Performed by: INTERNAL MEDICINE

## 2023-10-26 PROCEDURE — 3074F SYST BP LT 130 MM HG: CPT | Performed by: INTERNAL MEDICINE

## 2023-10-26 NOTE — PROGRESS NOTES
Assessment/Plan   83 years old male who has multiple chronic medical problems including peripheral arterial disease with the left leg amputation and uses a prosthesis.  Patient has right leg intermittent claudication but he is able to tolerated.  No resting ischemic pain.  He understands to follow-up with the vascular surgery.    Patient has ischemic cardiomyopathy and has had open heart surgery recently with [facility].  He had atrial fibrillation and he is now stable.  But he will follow-up with a cardiologist with the cardiac monitor report and also with the stress test.    Patient is not sure whether he is going to go to [place] for the winter months if he does he will follow-up with me when he returns in about 4 to 6 months time but otherwise I advised him to follow-up with me in about 2 to 3 months time.  At this time since his lab test from cardiologist is pending I will hold off doing any further lab test.    Fall prevention was discussed in detail.  Patient's management was discussed with the patient and the spouse was in the room.      Problem List Items Addressed This Visit       Atrial fibrillation (CMS/HCC) - Primary    Bilateral carotid artery stenosis    Cardiomyopathy (CMS/HCC)    Chronic obstructive pulmonary disease (CMS/HCC)    Claudication of right lower extremity (CMS/HCC)    Arteriosclerosis obliterans    Dyslipidemia    Essential hypertension, benign    Hyperglycemia    HTN (hypertension)    PVD (peripheral vascular disease) (CMS/HCC)       Subjective     Patient ID: Bautista Mendoza is a 83 y.o. male who presents for Follow-up.    History of present illness  Patient came for a follow-up visit accompanied by his spouse.  He has multiple chronic medical problems including atrial fibrillation for which he was on amiodarone but he is not taking it now since he had the open heart surgery at [facility].  He had seen the cardiologist after I spoke to him and he spoke to the cardiologist  about the atrial fibrillation and amiodarone.  Patient is now on a cardiac monitor to have this further decisions made.    Patient denies any palpitations.  He has no further episodes of chest pain.  He gets leg cramps when he do activities and walking.  But he is able to tolerated.  He has had several visits and procedures done.    He is a reformed smoker.    Patient brought his home blood pressure readings which are acceptable on my review.    He goes to [place] for the winter months but he is not sure whether he is going to go.  He is going to be having his stress test after this cardiac monitoring by the cardiologist.      Family History   Problem Relation Name Age of Onset    Cancer Mother        Social History     Socioeconomic History    Marital status:      Spouse name: Not on file    Number of children: Not on file    Years of education: Not on file    Highest education level: Not on file   Occupational History    Not on file   Tobacco Use    Smoking status: Former     Packs/day: .25     Types: Cigarettes    Smokeless tobacco: Never   Substance and Sexual Activity    Alcohol use: Not Currently     Comment: occassion    Drug use: Never    Sexual activity: Not on file   Other Topics Concern    Not on file   Social History Narrative    Not on file     Social Determinants of Health     Financial Resource Strain: Not on file   Food Insecurity: Not on file   Transportation Needs: Not on file   Physical Activity: Not on file   Stress: Not on file   Social Connections: Not on file   Intimate Partner Violence: Not on file   Housing Stability: Not on file      Patient has no known allergies.   Current Outpatient Medications   Medication Sig Dispense Refill    aspirin 81 mg EC tablet Take 1 tablet (81 mg) by mouth once daily.      atorvastatin (Lipitor) 40 mg tablet Take 1 tablet (40 mg) by mouth once daily.      cholecalciferol (Vitamin D-3) 50 MCG (2000 UT) tablet Take 1 tablet (2,000 Units) by mouth once  daily.      docusate sodium (Colace) 100 mg capsule Take 1 capsule (100 mg) by mouth 2 times a day.      FeroSuL 325 mg (65 mg iron) tablet TAKE 1 TABLET BY MOUTH DAILY AS DIRECTED 90 tablet 1    fluticasone propion-salmeteroL (Advair Diskus) 250-50 mcg/dose diskus inhaler INHALE 1 PUFF BY MOUTH EVERY 12 HOURS 180 each 1    lisinopril 10 mg tablet TAKE 1 TABLET(10 MG) BY MOUTH EVERY DAY 90 tablet 1    metoprolol tartrate (Lopressor) 25 mg tablet Take 2 tablets (50 mg) by mouth 2 times a day. 360 tablet 0    Xarelto 20 mg tablet Take 1 tablet (20 mg) by mouth once daily in the evening.      amiodarone (Pacerone) 200 mg tablet Take 1 tablet (200 mg) by mouth once daily.      oxyCODONE-acetaminophen (Percocet) 5-325 mg tablet Take 1 tablet by mouth every 6 hours if needed for severe pain (7 - 10) (pain in the leg). (Patient not taking: Reported on 10/26/2023) 100 tablet 0     No current facility-administered medications for this visit.        Objective   Normal-built, well-nourished with no apparent distress. Alert oriented  Skin: Normal turgor. No rash.  Head: Normocephalic, atraumatic.  Eyes: Pupils are equal, round,.  No pallor of conjunctivae.  Mucous membranes are moist  Neck: Supple. No JVD. No carotid bruit. No thyromegaly. No cervical lymphadenopathy.  No clubbing, has significant peripheral osteoarthritis noted,   Chest: Vesicular breathing Bilaterally moderate air entry and diffuse wheezing.  Rare crackles.  Heart: Irregular rate and rhythm controlled. S1, S2 positive.   Abdomen: Soft and nontender. Bowel sounds are positive. No organomegaly.  Extremities: Right leg no pedal pitting edema. Left leg has amputation with a prosthesis.  Comfortable with the prosthesis and walks well.  Right leg pedal pulses are diminished but no evidence of acute ischemia  Neuro Exam: No focal signs. Able to walk with the prostheses independently.           Vitals:    10/26/23 1151   BP: 114/77   Pulse: 76   Temp: 36.4 °C (97.5  °F)        Physical Exam    Problem List Items Addressed This Visit       Atrial fibrillation (CMS/HCC) - Primary    Bilateral carotid artery stenosis    Cardiomyopathy (CMS/HCC)    Chronic obstructive pulmonary disease (CMS/HCC)    Claudication of right lower extremity (CMS/HCC)    Arteriosclerosis obliterans    Dyslipidemia    Essential hypertension, benign    Hyperglycemia    HTN (hypertension)    PVD (peripheral vascular disease) (CMS/[facility])        No orders of the defined types were placed in this encounter.       Lab Results   Component Value Date    WBC 13.6 (H) 10/05/2023    HGB 15.1 10/05/2023    HCT 44.1 10/05/2023     10/05/2023    CHOL 101 10/05/2023    TRIG 90 10/05/2023    HDL 45.6 10/05/2023    ALT 17 05/13/2023    AST 19 05/13/2023     05/13/2023    K 3.9 05/13/2023     05/13/2023    CREATININE 0.81 05/13/2023    BUN 15 05/13/2023    CO2 28 05/13/2023    TSH 2.23 05/13/2023    PSA 0.60 05/08/2019    HGBA1C 5.9 (H) 10/05/2023     Lab Results   Component Value Date    CHOL 101 10/05/2023    LDLCALC 37 (L) 10/05/2023    CHHDL 2.2 10/05/2023       No results found.

## 2023-12-11 DIAGNOSIS — I10 ESSENTIAL HYPERTENSION, BENIGN: ICD-10-CM

## 2023-12-13 RX ORDER — METOPROLOL TARTRATE 25 MG/1
50 TABLET, FILM COATED ORAL 2 TIMES DAILY
Qty: 360 TABLET | Refills: 3 | Status: SHIPPED | OUTPATIENT
Start: 2023-12-13 | End: 2024-01-02 | Stop reason: ALTCHOICE

## 2023-12-22 DIAGNOSIS — K59.09 OTHER CONSTIPATION: Primary | ICD-10-CM

## 2023-12-22 RX ORDER — DOCUSATE SODIUM 100 MG/1
100 CAPSULE, LIQUID FILLED ORAL 2 TIMES DAILY
Qty: 180 CAPSULE | Refills: 0 | Status: SHIPPED | OUTPATIENT
Start: 2023-12-22 | End: 2024-03-21

## 2023-12-28 ENCOUNTER — TELEPHONE (OUTPATIENT)
Dept: PRIMARY CARE | Facility: CLINIC | Age: 83
End: 2023-12-28
Payer: MEDICARE

## 2023-12-28 ENCOUNTER — PATIENT OUTREACH (OUTPATIENT)
Dept: PRIMARY CARE | Facility: CLINIC | Age: 83
End: 2023-12-28
Payer: MEDICARE

## 2023-12-28 ENCOUNTER — DOCUMENTATION (OUTPATIENT)
Dept: PRIMARY CARE | Facility: CLINIC | Age: 83
End: 2023-12-28
Payer: MEDICARE

## 2023-12-28 DIAGNOSIS — I65.23 BILATERAL CAROTID ARTERY STENOSIS: ICD-10-CM

## 2023-12-28 DIAGNOSIS — Z98.62 S/P ANGIOPLASTY: ICD-10-CM

## 2023-12-28 NOTE — TELEPHONE ENCOUNTER
PT has an appt with you 1/10/24 and wondering if they need Bw before appt. Please advise pt at [phone]. Thanks

## 2023-12-28 NOTE — PROGRESS NOTES
Discharge Facility:  [facility]     Discharge Diagnosis:  Bilateral carotid artery stenosis   Operations during Hospitalization: 12/26/2023 left CEA with bovine pericardial patch angioplasty     Admission Date:12/26/2023   Discharge Date: 12/27/2023     PCP Appointment Date:PCP 1 week for BP check    1/2/2024 8:30 AM (30 min)    PRIMARY CARE HOSP DISCHARGE   [id number] ([place])   Dewey Higgins MD     Specialist Appointment Date:   1/24/2024 10:30 AM    Jesus Herron MD  Vascular Surgery  NPI: [id number]  [id number]  [place] [id number]  Phone: [phone]    Hospital Encounter and Summary: Linked   See discharge assessment below for further details  I introduced myself and the TCM program to Bautista Mendoza. I gave my contact information for any further questions.  Engagement  Call Start Time: 1020 (spoke with Wife) (12/28/2023 10:30 AM)    Medications  Medications reviewed with patient/caregiver?: Yes (12/28/2023 10:30 AM)  Is the patient having any side effects they believe may be caused by any medication additions or changes?: No (12/28/2023 10:30 AM)  Does the patient have all medications ordered at discharge?: Yes ([id number] [place] [address]) (12/28/2023 10:30 AM)  Care Management Interventions: No intervention needed; Provided patient education (12/28/2023 10:30 AM)  Prescription Comments: NEW for pain control-acetaminophen 500 mg tablet  Take 2 tablets by mouth every 6 hours as needed for pain,  oxyCODONE IR 5 mg immediate release tablet   Take 1 tablet by mouth every 6 hours as needed for pain for up to 7 days. (12/28/2023 10:30 AM)  Is the patient taking all medications as directed (includes completed medication regime)?: Yes (12/28/2023 10:30 AM)    Appointments  Does the patient have a primary care provider?: Yes (Dewey Higgins MD) (12/28/2023 10:30 AM)  Care Management Interventions: (S) Verified appointment date/time/provider (made appt for Jan 2nd  for hospital follow up . Keep prior scheudled appt for Jan 10th that was his regular 3 mo follow up. Wife advised to ask at appt if should keep or cancel.) (12/28/2023 10:30 AM)  Has the patient kept scheduled appointments due by today?: Yes (12/28/2023 10:30 AM)  Care Management Interventions: Educated on importance of keeping appointment (12/28/2023 10:30 AM)    Self Management  Has home health visited the patient within 72 hours of discharge?: Not applicable (12/28/2023 10:30 AM)  What Durable Medical Equipment (DME) was ordered?: Not applicable (12/28/2023 10:30 AM)    Patient Teaching  Does the patient have access to their discharge instructions?: Yes (12/28/2023 10:30 AM)  Care Management Interventions: Reviewed instructions with patient (12/28/2023 10:30 AM)  What is the patient's perception of their health status since discharge?: Same (sore all over but doing okay. Pain is undercontrol.) (12/28/2023 10:30 AM)  Is the patient/caregiver able to teach back the hierarchy of who to call/visit for symptoms/problems? PCP, Specialist, Home Health nurse, Urgent Care, ED, 911: Yes (12/28/2023 10:30 AM)    Wrap Up  Wrap Up Additional Comments: Wife stated that  has mentioned he has an ENT that he would like Bautista to see. I could not locate this in the notes but informed her that he may have put in a referal that I can not see. I suggested that she call  office to follow up. If she has not heard back from them by tuesday she can also ask Dr Higgins for recomendations. (12/28/2023 10:30 AM)  Call End Time: 1038 (12/28/2023 10:30 AM)

## 2023-12-29 PROBLEM — I65.22 CAROTID STENOSIS, LEFT: Status: ACTIVE | Noted: 2023-12-26

## 2023-12-29 RX ORDER — OXYCODONE HYDROCHLORIDE 5 MG/1
1 TABLET ORAL EVERY 6 HOURS PRN
COMMUNITY
Start: 2023-12-27 | End: 2024-01-03

## 2024-01-02 ENCOUNTER — OFFICE VISIT (OUTPATIENT)
Dept: PRIMARY CARE | Facility: CLINIC | Age: 84
End: 2024-01-02
Payer: MEDICARE

## 2024-01-02 VITALS
TEMPERATURE: 97.5 F | SYSTOLIC BLOOD PRESSURE: 149 MMHG | HEIGHT: 69 IN | BODY MASS INDEX: 28.23 KG/M2 | HEART RATE: 69 BPM | DIASTOLIC BLOOD PRESSURE: 90 MMHG | WEIGHT: 190.6 LBS

## 2024-01-02 DIAGNOSIS — I48.11 LONGSTANDING PERSISTENT ATRIAL FIBRILLATION (MULTI): ICD-10-CM

## 2024-01-02 DIAGNOSIS — S88.119A AMPUTATION BELOW KNEE (MULTI): ICD-10-CM

## 2024-01-02 DIAGNOSIS — I25.118 ATHEROSCLEROTIC HEART DISEASE OF NATIVE CORONARY ARTERY WITH OTHER FORMS OF ANGINA PECTORIS (CMS-HCC): Primary | ICD-10-CM

## 2024-01-02 DIAGNOSIS — I42.0 ISCHEMIC DILATED CARDIOMYOPATHY (MULTI): ICD-10-CM

## 2024-01-02 DIAGNOSIS — E55.9 VITAMIN D DEFICIENCY: ICD-10-CM

## 2024-01-02 DIAGNOSIS — R73.9 HYPERGLYCEMIA: ICD-10-CM

## 2024-01-02 DIAGNOSIS — I25.5 ISCHEMIC DILATED CARDIOMYOPATHY (MULTI): ICD-10-CM

## 2024-01-02 DIAGNOSIS — I10 ESSENTIAL HYPERTENSION, BENIGN: ICD-10-CM

## 2024-01-02 DIAGNOSIS — Z89.519: ICD-10-CM

## 2024-01-02 DIAGNOSIS — I74.3 EMBOLISM AND THROMBOSIS OF ARTERIES OF THE LOWER EXTREMITIES (MULTI): ICD-10-CM

## 2024-01-02 DIAGNOSIS — I73.9 CLAUDICATION OF RIGHT LOWER EXTREMITY (CMS-HCC): ICD-10-CM

## 2024-01-02 DIAGNOSIS — I25.5 ISCHEMIC CARDIOMYOPATHY: ICD-10-CM

## 2024-01-02 DIAGNOSIS — J42 CHRONIC BRONCHITIS, UNSPECIFIED CHRONIC BRONCHITIS TYPE (MULTI): ICD-10-CM

## 2024-01-02 DIAGNOSIS — I65.23 BILATERAL CAROTID ARTERY STENOSIS: ICD-10-CM

## 2024-01-02 DIAGNOSIS — M47.812 OSTEOARTHRITIS OF CERVICAL SPINE, UNSPECIFIED SPINAL OSTEOARTHRITIS COMPLICATION STATUS: ICD-10-CM

## 2024-01-02 PROCEDURE — 1160F RVW MEDS BY RX/DR IN RCRD: CPT | Performed by: INTERNAL MEDICINE

## 2024-01-02 PROCEDURE — 3080F DIAST BP >= 90 MM HG: CPT | Performed by: INTERNAL MEDICINE

## 2024-01-02 PROCEDURE — 1159F MED LIST DOCD IN RCRD: CPT | Performed by: INTERNAL MEDICINE

## 2024-01-02 PROCEDURE — 3077F SYST BP >= 140 MM HG: CPT | Performed by: INTERNAL MEDICINE

## 2024-01-02 PROCEDURE — 1036F TOBACCO NON-USER: CPT | Performed by: INTERNAL MEDICINE

## 2024-01-02 PROCEDURE — 99496 TRANSJ CARE MGMT HIGH F2F 7D: CPT | Performed by: INTERNAL MEDICINE

## 2024-01-02 RX ORDER — METOPROLOL TARTRATE 25 MG/1
12.5 TABLET, FILM COATED ORAL EVERY MORNING
Qty: 360 TABLET | Refills: 3 | Status: SHIPPED | OUTPATIENT
Start: 2024-01-02

## 2024-01-02 NOTE — PROGRESS NOTES
Assessment/Plan   83 years old male who is a reformed smoker has had history of multiple atherosclerotic disease including had ischemic cardiomyopathy and had open heart surgery at [facility] has left leg below-knee amputation and also he has significant peripheral artery disease in the right leg with the symptoms.  At this time he cannot have any surgery for the right leg because of his significant carotid disease.  He was treated with carotid endarterectomy for the left carotid and the he is awaiting to have a right carotid endarterectomy.  Patient is being followed up with [facility] vascular surgery and also other consults closely at this time.  He was planning to go to [place] for the winter months which he has canceled and he will be staying in [place].    Patient is stable his blood pressure is slightly elevated.  He was taking metoprolol 50 mg twice a day since the cardiac surgery.  I will cut down the metoprolol and start him on 12.5 mg in the mornings and he will monitor his heart rate and blood pressure at home.    He would need a follow-up blood test prior to his next carotid endarterectomy which will be done at [facility] during his follow-up and preop assessment.    Patient will call me if there is any other change otherwise he will follow-up with me in about 6 weeks time which would be after the right carotid endarterectomy.      Problem List Items Addressed This Visit       Amputation below knee (CMS/HCC)    Atrial fibrillation (CMS/HCC)    Relevant Medications    metoprolol tartrate (Lopressor) 25 mg tablet    Bilateral carotid artery stenosis    Cardiomyopathy (CMS/HCC)    Relevant Medications    metoprolol tartrate (Lopressor) 25 mg tablet    Cervical osteoarthritis    Chronic obstructive pulmonary disease (CMS/HCC)    Claudication of right lower extremity (CMS/HCC)    Essential hypertension, benign    Relevant Medications    metoprolol tartrate (Lopressor) 25 mg tablet     Hyperglycemia    Ischemic dilated cardiomyopathy (CMS/HCC)    Relevant Medications    metoprolol tartrate (Lopressor) 25 mg tablet    S/P unilateral BKA (below knee amputation) (CMS/HCC)    Vitamin D deficiency    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 male who presents for TCM and follow up [facility].    History of present illness  83 years old male with multiple chronic medical problems came for a follow-up visit after he has had left carotid endarterectomy done at [facility].  Since surgery he is stable and doing well.  He has no problem with swallowing.  He was told by the surgeon to see a ENT because of the nerve may be slightly damaged.  He says there is some decrease sensation in the lower part of the earlobe.  There is no difficulty in talking or breathing.    Patient is planning to have the right carotid enterectomy in about 3 weeks which is already scheduled in [facility].    He was stopped on metoprolol while he was in the hospital and he wants to know what to do with the medication.  He has history of cardiomyopathy and he has had open heart surgery.  He has no chest pain.  His exercise capacity is limited but there is no history of angina.    He denies any urine bowel changes.  ROS    Family History   Problem Relation Name Age of Onset    Cancer Mother        Social History     Socioeconomic History    Marital status:      Spouse name: Not on file    Number of children: Not on file    Years of education: Not on file    Highest education level: Not on file   Occupational History    Not on file   Tobacco Use    Smoking status: Former     Packs/day: .25     Types: Cigarettes    Smokeless tobacco: Never    Vaping Use    Vaping Use: Never used   Substance and Sexual Activity    Alcohol use: Not Currently     Comment: occassion    Drug use: Never    Sexual activity: Not on file   Other Topics Concern    Not on file   Social History Narrative    Not on file     Social Determinants of Health     Financial Resource Strain: Not on file   Food Insecurity: Not on file   Transportation Needs: Not on file   Physical Activity: Not on file   Stress: Not on file   Social Connections: Not on file   Intimate Partner Violence: Not on file   Housing Stability: Not on file      Patient has no known allergies.   Current Outpatient Medications   Medication Sig Dispense Refill    amiodarone (Pacerone) 200 mg tablet Take 1 tablet (200 mg) by mouth 2 times a day.      aspirin 81 mg EC tablet Take 1 tablet (81 mg) by mouth once daily.      atorvastatin (Lipitor) 40 mg tablet Take 1 tablet (40 mg) by mouth once daily.      cholecalciferol (Vitamin D-3) 50 MCG (2000 UT) tablet Take 1 tablet (2,000 Units) by mouth once daily.      docusate sodium (Colace) 100 mg capsule TAKE 1 CAPSULE BY MOUTH TWICE DAILY 180 capsule 0    FeroSuL 325 mg (65 mg iron) tablet TAKE 1 TABLET BY MOUTH DAILY AS DIRECTED 90 tablet 1    fluticasone propion-salmeteroL (Advair Diskus) 250-50 mcg/dose diskus inhaler INHALE 1 PUFF BY MOUTH EVERY 12 HOURS 180 each 1    lisinopril 10 mg tablet TAKE 1 TABLET(10 MG) BY MOUTH EVERY DAY 90 tablet 1    oxyCODONE (Roxicodone) 5 mg immediate release tablet Take 1 tablet (5 mg) by mouth every 6 hours if needed.      Xarelto 20 mg tablet Take 1 tablet (20 mg) by mouth once daily in the evening.      metoprolol tartrate (Lopressor) 25 mg tablet Take 0.5 tablets (12.5 mg) by mouth once daily in the morning. 360 tablet 3     No current facility-administered medications for this visit.       Objective     Vitals:    01/02/24 0856   BP: 149/90   Pulse: 69   Temp: 36.4 °C (97.5 °F)        Physical Exam  Normal-built, well-nourished with  no apparent distress. Alert oriented  Skin: Normal turgor. No rash.  Head: Normocephalic, atraumatic.  Eyes: Pupils are equal, round,.  No pallor of conjunctivae.  Mucous membranes are moist  Neck: Supple. No JVD.  Left carotid postoperative scar has no evidence of infection. No thyromegaly. No cervical lymphadenopathy.  Has slightly diminished sensation in the left lower earlobe.  No clubbing, has significant peripheral osteoarthritis noted,   Chest: Vesicular breathing Bilaterally moderate air entry and diffuse wheezing.  Rare crackles.  Heart: Irregular rate and rhythm controlled. S1, S2 positive.   Abdomen: Soft and nontender. Bowel sounds are positive. No organomegaly.  Extremities: Right leg no pedal pitting edema. Left leg has amputation with a prosthesis.  Comfortable with the prosthesis and walks well.  Right leg pedal pulses are diminished but no evidence of acute ischemia  Neuro Exam: No focal signs. Able to walk with the prostheses independently.             Problem List Items Addressed This Visit       Amputation below knee (CMS/HCC)    Atrial fibrillation (CMS/HCC)    Relevant Medications    metoprolol tartrate (Lopressor) 25 mg tablet    Bilateral carotid artery stenosis    Cardiomyopathy (CMS/HCC)    Relevant Medications    metoprolol tartrate (Lopressor) 25 mg tablet    Cervical osteoarthritis    Chronic obstructive pulmonary disease (CMS/HCC)    Claudication of right lower extremity (CMS/HCC)    Essential hypertension, benign    Relevant Medications    metoprolol tartrate (Lopressor) 25 mg tablet    Hyperglycemia    Ischemic dilated cardiomyopathy (CMS/HCC)    Relevant Medications    metoprolol tartrate (Lopressor) 25 mg tablet    S/P unilateral BKA (below knee amputation) (CMS/HCC)    Vitamin D deficiency    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       Lab Results   Component Value Date    WBC 13.6 (H) 10/05/2023    HGB 15.1 10/05/2023    HCT 44.1 10/05/2023     10/05/2023    CHOL 101 10/05/2023    TRIG 90 10/05/2023    HDL 45.6 10/05/2023    ALT 17 05/13/2023    AST 19 05/13/2023     05/13/2023    K 3.9 05/13/2023     05/13/2023    CREATININE 0.81 05/13/2023    BUN 15 05/13/2023    CO2 28 05/13/2023    TSH 2.23 05/13/2023    PSA 0.60 05/08/2019    HGBA1C 5.9 (H) 12/04/2023     Lab Results   Component Value Date    CHOL 101 10/05/2023    LDLCALC 37 (L) 10/05/2023    CHHDL 2.2 10/05/2023       No results found.

## 2024-01-10 ENCOUNTER — APPOINTMENT (OUTPATIENT)
Dept: PRIMARY CARE | Facility: CLINIC | Age: 84
End: 2024-01-10
Payer: MEDICARE

## 2024-01-16 ENCOUNTER — APPOINTMENT (OUTPATIENT)
Dept: PRIMARY CARE | Facility: CLINIC | Age: 84
End: 2024-01-16
Payer: MEDICARE

## 2024-01-25 ENCOUNTER — PATIENT OUTREACH (OUTPATIENT)
Dept: PRIMARY CARE | Facility: CLINIC | Age: 84
End: 2024-01-25
Payer: MEDICARE

## 2024-01-25 NOTE — PROGRESS NOTES
Call regarding appt. with PCP on 1/2/24 and Vascular Surg 1/24/24 after hospitalization.  At time of outreach call per wife the patient feels as if their condition has improved since last visit.  Reviewed the PCP /vascular surg appointment with the pt and addressed any questions or concerns.

## 2024-02-05 DIAGNOSIS — I48.0 PAROXYSMAL ATRIAL FIBRILLATION (MULTI): Primary | ICD-10-CM

## 2024-02-05 RX ORDER — RIVAROXABAN 20 MG/1
20 TABLET, FILM COATED ORAL EVERY EVENING
Qty: 90 TABLET | Refills: 1 | Status: SHIPPED | OUTPATIENT
Start: 2024-02-05

## 2024-02-05 NOTE — TELEPHONE ENCOUNTER
Rx Refill Request Telephone Encounter    Name:  Bautista Mendoza  :  [id number]  Medication Name:  Xarelto 20 mg tablet       Specific Pharmacy location:    [facility] - [address] Phone: [phone]   Fax: [phone]          Date of last appointment:  10/26/2024    Date of next appointment:  2024

## 2024-02-13 ENCOUNTER — APPOINTMENT (OUTPATIENT)
Dept: PRIMARY CARE | Facility: CLINIC | Age: 84
End: 2024-02-13
Payer: MEDICARE

## 2024-02-20 ENCOUNTER — APPOINTMENT (OUTPATIENT)
Dept: PRIMARY CARE | Facility: CLINIC | Age: 84
End: 2024-02-20
Payer: MEDICARE

## 2024-02-29 ENCOUNTER — PATIENT OUTREACH (OUTPATIENT)
Dept: PRIMARY CARE | Facility: CLINIC | Age: 84
End: 2024-02-29
Payer: MEDICARE

## 2024-02-29 NOTE — PROGRESS NOTES
CM reviewing chart for outreach and noted current admission. Will monitor for discharge.     Chart noted this morning 2/29/24  ENDARTERECTOMY CAROTID ADULT (Right: Carotid) Jean Carlos Cook I, MD 02/29/24 0735   [facility] Operating Room   [address] [place] [address]

## 2024-03-04 ENCOUNTER — PATIENT OUTREACH (OUTPATIENT)
Dept: PRIMARY CARE | Facility: CLINIC | Age: 84
End: 2024-03-04
Payer: MEDICARE

## 2024-03-04 DIAGNOSIS — Z98.890 HISTORY OF RIGHT-SIDED CAROTID ENDARTERECTOMY: ICD-10-CM

## 2024-03-04 DIAGNOSIS — I65.21 ASYMPTOMATIC CAROTID ARTERY STENOSIS WITHOUT INFARCTION, RIGHT: ICD-10-CM

## 2024-03-04 RX ORDER — OXYCODONE HYDROCHLORIDE 5 MG/1
1 TABLET ORAL EVERY 6 HOURS PRN
COMMUNITY
Start: 2024-03-01 | End: 2024-03-06

## 2024-03-04 NOTE — PROGRESS NOTES
Discharge Facility:  [facility]       Discharge Diagnosis:  Asymptomatic right carotid artery stenosis     Operations Performed While in the Hospital:   2/29/2024 Right carotid endarterectomy with bovine pericardial patch angioplasty, completion duplex     Admission Date:2/29/24  Discharge Date: 3/1/24    PCP Appointment Date:   3/11/2024 9:30 AM   [facility]   PRIMARY CARE HOSP DISCHARGE   [id number] ([place])   Dewey Higgins MD     Specialist Appointment Date:   Follow up with Dr. Herron in one month with carotid duplex -scheduled for 4/3/2024 at 8 am    Hospital Encounter and Summary: Linked   See discharge assessment below for further details  Engagement  Call Start Time: 1553 (spke with wife) (3/4/2024  3:53 PM)    Medications  Medications reviewed with patient/caregiver?: Yes (3/4/2024  3:53 PM)  Is the patient having any side effects they believe may be caused by any medication additions or changes?: No (3/4/2024  3:53 PM)  Does the patient have all medications ordered at discharge?: Yes (3/4/2024  3:53 PM)  Care Management Interventions: Provided patient education (3/4/2024  3:53 PM)  Prescription Comments: New: oxyCODONE IR (ROXICODONE) 5 mg immediate release tablet   Indications: S/P vascular surgery Take 1 tablet by mouth every 6 hours as needed for pain for up to 5 days. (3/4/2024  3:53 PM)  Is the patient taking all medications as directed (includes completed medication regime)?: Yes (3/4/2024  3:53 PM)  Medication Comments: Wife reported that patient is taking pain medication but is still very sore and having trouble sleeping from being uncomftable and feeling like he cant breath well through his nose. I let her know if it does not improve as days go on to call provider to ask if there is anything else they suggest before follow up appt. (3/4/2024  3:53 PM)    Appointments  Does the patient have a primary care provider?: Yes (3/4/2024  3:53 PM)  Care Management Interventions: Verified  appointment date/time/provider (reviewed with wife date/time of PCP appt) (3/4/2024  3:53 PM)  Has the patient kept scheduled appointments due by today?: Yes (3/4/2024  3:53 PM)  Care Management Interventions: Educated on importance of keeping appointment (reviewed with wife date/time of  appt with Dr Herron) (3/4/2024  3:53 PM)    Self Management  Has home health visited the patient within 72 hours of discharge?: Not applicable (3/4/2024  3:53 PM)  What Durable Medical Equipment (DME) was ordered?: Not applicable (3/4/2024  3:53 PM)    Patient Teaching  Does the patient have access to their discharge instructions?: Yes (3/4/2024  3:53 PM)  Care Management Interventions: Reviewed instructions with patient (3/4/2024  3:53 PM)  What is the patient's perception of their health status since discharge?: Same (Still uncomfortable but know it is to be expected) (3/4/2024  3:53 PM)    Wrap Up  Wrap Up Additional Comments: I introduced myself and the TCM program to Bautista Mendoza's wife.  Reviewed hospital stay and answered any questions. I gave my contact information and encouraged to call me if needing assistance or has any further questions prior to my next outreach. (3/4/2024  3:53 PM)  Call End Time: 1559 (3/4/2024  3:53 PM)

## 2024-03-11 ENCOUNTER — OFFICE VISIT (OUTPATIENT)
Dept: PRIMARY CARE | Facility: CLINIC | Age: 84
End: 2024-03-11
Payer: MEDICARE

## 2024-03-11 VITALS
HEART RATE: 85 BPM | HEIGHT: 69 IN | DIASTOLIC BLOOD PRESSURE: 63 MMHG | BODY MASS INDEX: 28.29 KG/M2 | SYSTOLIC BLOOD PRESSURE: 92 MMHG | WEIGHT: 191 LBS

## 2024-03-11 DIAGNOSIS — I65.22 CAROTID STENOSIS, LEFT: ICD-10-CM

## 2024-03-11 DIAGNOSIS — I73.9 CLAUDICATION OF RIGHT LOWER EXTREMITY (CMS-HCC): ICD-10-CM

## 2024-03-11 DIAGNOSIS — S88.119A AMPUTATION BELOW KNEE (MULTI): ICD-10-CM

## 2024-03-11 DIAGNOSIS — I48.0 PAROXYSMAL ATRIAL FIBRILLATION (MULTI): ICD-10-CM

## 2024-03-11 DIAGNOSIS — I10 ESSENTIAL HYPERTENSION, BENIGN: ICD-10-CM

## 2024-03-11 DIAGNOSIS — I25.5 ISCHEMIC CARDIOMYOPATHY: ICD-10-CM

## 2024-03-11 DIAGNOSIS — M62.838 MUSCLE SPASM: ICD-10-CM

## 2024-03-11 DIAGNOSIS — M25.552 LEFT HIP PAIN: ICD-10-CM

## 2024-03-11 DIAGNOSIS — M25.551 RIGHT HIP PAIN: ICD-10-CM

## 2024-03-11 DIAGNOSIS — I65.23 BILATERAL CAROTID ARTERY STENOSIS: Primary | ICD-10-CM

## 2024-03-11 PROCEDURE — 1036F TOBACCO NON-USER: CPT | Performed by: INTERNAL MEDICINE

## 2024-03-11 PROCEDURE — 1159F MED LIST DOCD IN RCRD: CPT | Performed by: INTERNAL MEDICINE

## 2024-03-11 PROCEDURE — 3078F DIAST BP <80 MM HG: CPT | Performed by: INTERNAL MEDICINE

## 2024-03-11 PROCEDURE — 99495 TRANSJ CARE MGMT MOD F2F 14D: CPT | Performed by: INTERNAL MEDICINE

## 2024-03-11 PROCEDURE — 3074F SYST BP LT 130 MM HG: CPT | Performed by: INTERNAL MEDICINE

## 2024-03-11 PROCEDURE — 1160F RVW MEDS BY RX/DR IN RCRD: CPT | Performed by: INTERNAL MEDICINE

## 2024-03-11 RX ORDER — CYCLOBENZAPRINE HCL 5 MG
5 TABLET ORAL NIGHTLY
Qty: 30 TABLET | Refills: 1 | Status: SHIPPED | OUTPATIENT
Start: 2024-03-11 | End: 2024-05-21

## 2024-03-11 ASSESSMENT — LIFESTYLE VARIABLES
AUDIT TOTAL SCORE: 0
SKIP TO QUESTIONS 9-10: 1
HAS A RELATIVE, FRIEND, DOCTOR, OR ANOTHER HEALTH PROFESSIONAL EXPRESSED CONCERN ABOUT YOUR DRINKING OR SUGGESTED YOU CUT DOWN: NO
HOW MANY STANDARD DRINKS CONTAINING ALCOHOL DO YOU HAVE ON A TYPICAL DAY: PATIENT DOES NOT DRINK
AUDIT-C TOTAL SCORE: 0
HOW OFTEN DO YOU HAVE SIX OR MORE DRINKS ON ONE OCCASION: NEVER
HOW OFTEN DO YOU HAVE A DRINK CONTAINING ALCOHOL: NEVER
HAVE YOU OR SOMEONE ELSE BEEN INJURED AS A RESULT OF YOUR DRINKING: NO

## 2024-03-11 ASSESSMENT — PATIENT HEALTH QUESTIONNAIRE - PHQ9
SUM OF ALL RESPONSES TO PHQ9 QUESTIONS 1 AND 2: 0
2. FEELING DOWN, DEPRESSED OR HOPELESS: NOT AT ALL
1. LITTLE INTEREST OR PLEASURE IN DOING THINGS: NOT AT ALL

## 2024-03-11 NOTE — PROGRESS NOTES
Assessment/Plan   83 years old male with multiple chronic medical problems including left leg below-knee amputation is able to ambulate with the prosthesis.  His cardiomyopathy seems to be stable.    His claudication in the right leg is stable but he is waiting to have further evaluation through the [facility] vascular surgery about revascularization.    He has stopped smoking.    Patient's hip pain and muscle spasms were reviewed and discussed.  Will avoid any controlled substances and after detailed discussion we decided to start him on muscle relaxants.  Side effects were reviewed and discussed.  Fall prevention was discussed in detail.    Patient's carotid surgery were reviewed and he is recovering well.    Patient's paroxysmal atrial fibrillation is stable and his rate is controlled.    Patient will be followed up in 1 month's time with a lab test as ordered.  But he understands if there is any new lab test from clinic physicians he could combine it together.      Problem List Items Addressed This Visit       Amputation below knee (CMS/HCC)    Atrial fibrillation (CMS/HCC)    Relevant Orders    CBC and Auto Differential    RESOLVED: Bilateral carotid artery stenosis    Relevant Orders    Lipid Panel    Cardiomyopathy (CMS/HCC) - Primary    Relevant Orders    Lipid Panel    Claudication of right lower extremity (CMS/HCC)    Essential hypertension, benign    Relevant Orders    CBC and Auto Differential    Comprehensive Metabolic Panel    Right hip pain    RESOLVED: Carotid stenosis, left    Left hip pain     Other Visit Diagnoses       Muscle spasm        Relevant Medications    cyclobenzaprine (Flexeril) 5 mg tablet            Subjective     Patient ID: Bautista Mendoza is a 83 y.o. male who presents for [facility] d/c.    History of present illness  83 years old male who has had multiple chronic medical problems including cardiomyopathy with open heart surgery at [facility] has atrial  fibrillation the rate controlled is on chronic anticoagulation has had bilateral carotid artery stenosis and also has peripheral arterial disease with left leg below-knee amputation came for a follow-up visit after he has had right carotid arterectomy at [facility].  I have reviewed the hospital records, discharge medications, all the pertinent labs, imaging studies, procedures associated with the hospital stay and have summarized the patient's care to incorporate into today's patient's clinical management.    Patient says he has had elevated blood pressure in the hospital but at home his blood pressure is fluctuating.  He denies any dizziness no history of any falls.    He is complaining of right hip pain which is exacerbated because he has been having some left hip pain.  He is able to walk independently with his prosthesis.  He uses a cane but he did not bring it into the office.  ROS  Complaining of muscle spasms but due to his hip pain.  But he is able to tolerate it.  Family History   Problem Relation Name Age of Onset    Cancer Mother        Social History     Socioeconomic History    Marital status:      Spouse name: Not on file    Number of children: Not on file    Years of education: Not on file    Highest education level: Not on file   Occupational History    Not on file   Tobacco Use    Smoking status: Former     Packs/day: 0.25     Years: 45.00     Additional pack years: 0.00     Total pack years: 11.25     Types: Cigarettes     Start date:      Quit date: 2000     Years since quittin.2    Smokeless tobacco: Never   Vaping Use    Vaping Use: Never used   Substance and Sexual Activity    Alcohol use: Not Currently     Comment: occassion    Drug use: Never    Sexual activity: Not on file   Other Topics Concern    Not on file   Social History Narrative    Not on file     Social Determinants of Health     Financial Resource Strain: Not on file   Food Insecurity: Not on file    Transportation Needs: Not on file   Physical Activity: Not on file   Stress: Not on file   Social Connections: Not on file   Intimate Partner Violence: Not on file   Housing Stability: Not on file      Hydromorphone   Current Outpatient Medications   Medication Sig Dispense Refill    amiodarone (Pacerone) 200 mg tablet Take 1 tablet (200 mg) by mouth 2 times a day.      aspirin 81 mg EC tablet Take 1 tablet (81 mg) by mouth once daily.      atorvastatin (Lipitor) 40 mg tablet Take 1 tablet (40 mg) by mouth once daily.      cholecalciferol (Vitamin D-3) 50 MCG (2000 UT) tablet Take 1 tablet (2,000 Units) by mouth once daily.      docusate sodium (Colace) 100 mg capsule TAKE 1 CAPSULE BY MOUTH TWICE DAILY 180 capsule 0    FeroSuL 325 mg (65 mg iron) tablet TAKE 1 TABLET BY MOUTH DAILY AS DIRECTED 90 tablet 1    fluticasone propion-salmeteroL (Advair Diskus) 250-50 mcg/dose diskus inhaler INHALE 1 PUFF BY MOUTH EVERY 12 HOURS 180 each 1    lisinopril 10 mg tablet TAKE 1 TABLET(10 MG) BY MOUTH EVERY DAY 90 tablet 1    metoprolol tartrate (Lopressor) 25 mg tablet Take 0.5 tablets (12.5 mg) by mouth once daily in the morning. 360 tablet 3    Xarelto 20 mg tablet Take 1 tablet (20 mg) by mouth once daily in the evening. 90 tablet 1    cyclobenzaprine (Flexeril) 5 mg tablet Take 1 tablet (5 mg) by mouth once daily at bedtime. 30 tablet 1     No current facility-administered medications for this visit.        Objective     Vitals:    03/11/24 0939   BP: 92/63   Pulse: 85        Physical Exam  Normal-built, well-nourished with no apparent distress. Alert oriented  Skin: Normal turgor. No rash.  Head: Normocephalic, atraumatic.  Eyes: Pupils are equal, round,.  No pallor of conjunctivae.  Mucous membranes are moist  Neck: Supple. No JVD.  Left carotid postoperative scar has no evidence of infection. No thyromegaly. No cervical lymphadenopathy.    Left neck scar healed. Right neck scar healing. Both ear lobe numbness  present.  No clubbing, has significant peripheral osteoarthritis noted,   Chest: Vesicular breathing Bilaterally moderate air entry and diffuse wheezing.  Rare crackles.  Heart: Irregular rate and rhythm controlled. S1, S2 positive.   Abdomen: Soft and nontender. Bowel sounds are positive. No organomegaly.  Extremities: Right leg no pedal pitting edema. Left leg has amputation with a prosthesis.  Comfortable with the prosthesis and walks well.  Right leg pedal pulses are diminished but no evidence of acute ischemia  Neuro Exam: No focal signs. Able to walk with the prostheses independently.        Problem List Items Addressed This Visit       Amputation below knee (CMS/HCC)    Atrial fibrillation (CMS/HCC)    Relevant Orders    CBC and Auto Differential    RESOLVED: Bilateral carotid artery stenosis    Relevant Orders    Lipid Panel    Cardiomyopathy (CMS/HCC) - Primary    Relevant Orders    Lipid Panel    Claudication of right lower extremity (CMS/HCC)    Essential hypertension, benign    Relevant Orders    CBC and Auto Differential    Comprehensive Metabolic Panel    Right hip pain    RESOLVED: Carotid stenosis, left    Left hip pain     Other Visit Diagnoses       Muscle spasm        Relevant Medications    cyclobenzaprine (Flexeril) 5 mg tablet             Orders Placed This Encounter   Procedures    CBC and Auto Differential     Standing Status:   Future     Standing Expiration Date:   3/11/2025     Order Specific Question:   Release result to [facility]     Answer:   Immediate    Comprehensive Metabolic Panel     Standing Status:   Future     Standing Expiration Date:   3/11/2025     Order Specific Question:   Release result to [facility]odru[facility]     Answer:   Immediate    Lipid Panel     fasting     Standing Status:   Future     Standing Expiration Date:   7/11/2024     Order Specific Question:   Release result to MyChart     Answer:   Immediate [1]        Lab Results   Component Value Date    WBC 13.6 (H) 10/05/2023     HGB 15.1 10/05/2023    HCT 44.1 10/05/2023     10/05/2023    CHOL 101 10/05/2023    TRIG 90 10/05/2023    HDL 45.6 10/05/2023    ALT 17 05/13/2023    AST 19 05/13/2023     05/13/2023    K 3.9 05/13/2023     05/13/2023    CREATININE 0.81 05/13/2023    BUN 15 05/13/2023    CO2 28 05/13/2023    TSH 2.23 05/13/2023    PSA 0.60 05/08/2019    HGBA1C 5.9 (H) 12/04/2023     Lab Results   Component Value Date    CHOL 101 10/05/2023    LDLCALC 37 (L) 10/05/2023    CHHDL 2.2 10/05/2023       No results found.

## 2024-03-21 DIAGNOSIS — K59.09 OTHER CONSTIPATION: ICD-10-CM

## 2024-03-21 RX ORDER — DOCUSATE SODIUM 100 MG/1
100 CAPSULE, LIQUID FILLED ORAL 2 TIMES DAILY
Qty: 180 CAPSULE | Refills: 1 | Status: SHIPPED | OUTPATIENT
Start: 2024-03-21

## 2024-03-26 ENCOUNTER — PATIENT OUTREACH (OUTPATIENT)
Dept: PRIMARY CARE | Facility: CLINIC | Age: 84
End: 2024-03-26
Payer: MEDICARE

## 2024-03-26 NOTE — PROGRESS NOTES
Unable to reach patient for call back after patient's follow up appointment with PCP 3/11/24   LVM with call back number for patient to call if needed to assist with any questions or concerns patient may have.

## 2024-04-16 DIAGNOSIS — E78.5 DYSLIPIDEMIA: Primary | ICD-10-CM

## 2024-04-17 RX ORDER — ATORVASTATIN CALCIUM 40 MG/1
40 TABLET, FILM COATED ORAL NIGHTLY
Qty: 90 TABLET | Refills: 3 | Status: SHIPPED | OUTPATIENT
Start: 2024-04-17

## 2024-04-19 ENCOUNTER — LAB (OUTPATIENT)
Dept: LAB | Facility: LAB | Age: 84
End: 2024-04-19
Payer: MEDICARE

## 2024-04-19 ENCOUNTER — TELEPHONE (OUTPATIENT)
Dept: PRIMARY CARE | Facility: CLINIC | Age: 84
End: 2024-04-19

## 2024-04-19 DIAGNOSIS — I70.219 ATHEROSCLEROTIC PERIPHERAL VASCULAR DISEASE WITH INTERMITTENT CLAUDICATION (CMS-HCC): ICD-10-CM

## 2024-04-19 DIAGNOSIS — I65.23 BILATERAL CAROTID ARTERY STENOSIS: ICD-10-CM

## 2024-04-19 DIAGNOSIS — I10 ESSENTIAL HYPERTENSION, BENIGN: ICD-10-CM

## 2024-04-19 DIAGNOSIS — I10 PRIMARY HYPERTENSION: ICD-10-CM

## 2024-04-19 DIAGNOSIS — I48.0 PAROXYSMAL ATRIAL FIBRILLATION (MULTI): ICD-10-CM

## 2024-04-19 DIAGNOSIS — I25.5 ISCHEMIC CARDIOMYOPATHY: ICD-10-CM

## 2024-04-19 LAB
ALBUMIN SERPL BCP-MCNC: 4.1 G/DL (ref 3.4–5)
ALP SERPL-CCNC: 63 U/L (ref 33–136)
ALT SERPL W P-5'-P-CCNC: 15 U/L (ref 10–52)
ANION GAP SERPL CALC-SCNC: 12 MMOL/L (ref 10–20)
AST SERPL W P-5'-P-CCNC: 17 U/L (ref 9–39)
BASOPHILS # BLD AUTO: 0.05 X10*3/UL (ref 0–0.1)
BASOPHILS NFR BLD AUTO: 0.6 %
BILIRUB SERPL-MCNC: 0.7 MG/DL (ref 0–1.2)
BUN SERPL-MCNC: 18 MG/DL (ref 6–23)
CALCIUM SERPL-MCNC: 8.8 MG/DL (ref 8.6–10.3)
CHLORIDE SERPL-SCNC: 100 MMOL/L (ref 98–107)
CHOLEST SERPL-MCNC: 113 MG/DL (ref 0–199)
CHOLESTEROL/HDL RATIO: 2
CO2 SERPL-SCNC: 29 MMOL/L (ref 21–32)
CREAT SERPL-MCNC: 0.91 MG/DL (ref 0.5–1.3)
EGFRCR SERPLBLD CKD-EPI 2021: 85 ML/MIN/1.73M*2
EOSINOPHIL # BLD AUTO: 0.29 X10*3/UL (ref 0–0.4)
EOSINOPHIL NFR BLD AUTO: 3.3 %
ERYTHROCYTE [DISTWIDTH] IN BLOOD BY AUTOMATED COUNT: 14 % (ref 11.5–14.5)
GLUCOSE SERPL-MCNC: 104 MG/DL (ref 74–99)
HCT VFR BLD AUTO: 42.3 % (ref 41–52)
HDLC SERPL-MCNC: 55.5 MG/DL
HGB BLD-MCNC: 13.8 G/DL (ref 13.5–17.5)
IMM GRANULOCYTES # BLD AUTO: 0.04 X10*3/UL (ref 0–0.5)
IMM GRANULOCYTES NFR BLD AUTO: 0.4 % (ref 0–0.9)
LDLC SERPL CALC-MCNC: 46 MG/DL
LYMPHOCYTES # BLD AUTO: 3.26 X10*3/UL (ref 0.8–3)
LYMPHOCYTES NFR BLD AUTO: 36.5 %
MCH RBC QN AUTO: 30.9 PG (ref 26–34)
MCHC RBC AUTO-ENTMCNC: 32.6 G/DL (ref 32–36)
MCV RBC AUTO: 95 FL (ref 80–100)
MONOCYTES # BLD AUTO: 1.11 X10*3/UL (ref 0.05–0.8)
MONOCYTES NFR BLD AUTO: 12.4 %
NEUTROPHILS # BLD AUTO: 4.17 X10*3/UL (ref 1.6–5.5)
NEUTROPHILS NFR BLD AUTO: 46.8 %
NON HDL CHOLESTEROL: 58 MG/DL (ref 0–149)
NRBC BLD-RTO: 0 /100 WBCS (ref 0–0)
PLATELET # BLD AUTO: 235 X10*3/UL (ref 150–450)
POTASSIUM SERPL-SCNC: 4.6 MMOL/L (ref 3.5–5.3)
PROT SERPL-MCNC: 6.9 G/DL (ref 6.4–8.2)
RBC # BLD AUTO: 4.46 X10*6/UL (ref 4.5–5.9)
SODIUM SERPL-SCNC: 136 MMOL/L (ref 136–145)
TRIGL SERPL-MCNC: 56 MG/DL (ref 0–149)
VLDL: 11 MG/DL (ref 0–40)
WBC # BLD AUTO: 8.9 X10*3/UL (ref 4.4–11.3)

## 2024-04-19 PROCEDURE — 85025 COMPLETE CBC W/AUTO DIFF WBC: CPT

## 2024-04-19 PROCEDURE — 80061 LIPID PANEL: CPT

## 2024-04-19 PROCEDURE — 80053 COMPREHEN METABOLIC PANEL: CPT

## 2024-04-19 PROCEDURE — 36415 COLL VENOUS BLD VENIPUNCTURE: CPT

## 2024-04-19 NOTE — TELEPHONE ENCOUNTER
----- Message from Dewey Higgins MD sent at 4/19/2024  1:24 PM EDT -----  Results are acceptable.  We will review the results in detail during office follow-up and please advise patient to keep the follow-up appointment as scheduled.

## 2024-04-19 NOTE — RESULT ENCOUNTER NOTE
Results are acceptable.  We will review the results in detail during office follow-up and please advise patient to keep the follow-up appointment as scheduled.

## 2024-04-25 ENCOUNTER — APPOINTMENT (OUTPATIENT)
Dept: PRIMARY CARE | Facility: CLINIC | Age: 84
End: 2024-04-25
Payer: MEDICARE

## 2024-04-30 ENCOUNTER — PATIENT OUTREACH (OUTPATIENT)
Dept: PRIMARY CARE | Facility: CLINIC | Age: 84
End: 2024-04-30
Payer: MEDICARE

## 2024-04-30 NOTE — PROGRESS NOTES
Unable to reach patient for discharge follow up call.   LVM with call back number for patient to call if needed to assist with any questions or concerns patient may have.

## 2024-05-01 ENCOUNTER — TELEPHONE (OUTPATIENT)
Dept: PRIMARY CARE | Facility: CLINIC | Age: 84
End: 2024-05-01

## 2024-05-01 ENCOUNTER — OFFICE VISIT (OUTPATIENT)
Dept: PRIMARY CARE | Facility: CLINIC | Age: 84
End: 2024-05-01
Payer: MEDICARE

## 2024-05-01 VITALS
BODY MASS INDEX: 28.44 KG/M2 | SYSTOLIC BLOOD PRESSURE: 151 MMHG | HEIGHT: 69 IN | WEIGHT: 192 LBS | HEART RATE: 62 BPM | DIASTOLIC BLOOD PRESSURE: 82 MMHG

## 2024-05-01 DIAGNOSIS — M79.605 CHRONIC PAIN OF BOTH LOWER EXTREMITIES: ICD-10-CM

## 2024-05-01 DIAGNOSIS — I10 ESSENTIAL HYPERTENSION, BENIGN: ICD-10-CM

## 2024-05-01 DIAGNOSIS — G89.29 CHRONIC PAIN OF BOTH LOWER EXTREMITIES: ICD-10-CM

## 2024-05-01 DIAGNOSIS — M79.604 CHRONIC PAIN OF BOTH LOWER EXTREMITIES: ICD-10-CM

## 2024-05-01 DIAGNOSIS — I73.9 PERIPHERAL ARTERIAL DISEASE (CMS-HCC): ICD-10-CM

## 2024-05-01 DIAGNOSIS — I48.0 PAROXYSMAL ATRIAL FIBRILLATION (MULTI): Primary | ICD-10-CM

## 2024-05-01 DIAGNOSIS — T14.8XXA INTRAMUSCULAR HEMATOMA: ICD-10-CM

## 2024-05-01 DIAGNOSIS — E78.5 DYSLIPIDEMIA: ICD-10-CM

## 2024-05-01 DIAGNOSIS — I25.5 ISCHEMIC CARDIOMYOPATHY: ICD-10-CM

## 2024-05-01 PROCEDURE — 3077F SYST BP >= 140 MM HG: CPT | Performed by: INTERNAL MEDICINE

## 2024-05-01 PROCEDURE — 3079F DIAST BP 80-89 MM HG: CPT | Performed by: INTERNAL MEDICINE

## 2024-05-01 PROCEDURE — 99214 OFFICE O/P EST MOD 30 MIN: CPT | Performed by: INTERNAL MEDICINE

## 2024-05-01 PROCEDURE — 1036F TOBACCO NON-USER: CPT | Performed by: INTERNAL MEDICINE

## 2024-05-01 PROCEDURE — 1160F RVW MEDS BY RX/DR IN RCRD: CPT | Performed by: INTERNAL MEDICINE

## 2024-05-01 PROCEDURE — 1159F MED LIST DOCD IN RCRD: CPT | Performed by: INTERNAL MEDICINE

## 2024-05-01 RX ORDER — HYDROCODONE BITARTRATE AND ACETAMINOPHEN 5; 325 MG/1; MG/1
1 TABLET ORAL EVERY 4 HOURS PRN
Qty: 50 TABLET | Refills: 0 | Status: SHIPPED | OUTPATIENT
Start: 2024-05-01 | End: 2024-05-11

## 2024-05-01 RX ORDER — ARNICA 200 MG/ML
2 LIQUID TOPICAL 2 TIMES DAILY
Qty: 120 ML | Refills: 1 | Status: SHIPPED | OUTPATIENT
Start: 2024-05-01

## 2024-05-01 ASSESSMENT — LIFESTYLE VARIABLES
HAVE YOU OR SOMEONE ELSE BEEN INJURED AS A RESULT OF YOUR DRINKING: NO
HOW MANY STANDARD DRINKS CONTAINING ALCOHOL DO YOU HAVE ON A TYPICAL DAY: 1 OR 2
HAS A RELATIVE, FRIEND, DOCTOR, OR ANOTHER HEALTH PROFESSIONAL EXPRESSED CONCERN ABOUT YOUR DRINKING OR SUGGESTED YOU CUT DOWN: NO
AUDIT-C TOTAL SCORE: 1
HOW OFTEN DO YOU HAVE SIX OR MORE DRINKS ON ONE OCCASION: NEVER
AUDIT TOTAL SCORE: 1
SKIP TO QUESTIONS 9-10: 1
HOW OFTEN DO YOU HAVE A DRINK CONTAINING ALCOHOL: MONTHLY OR LESS

## 2024-05-01 NOTE — TELEPHONE ENCOUNTER
Patient cancelled on 4/25/24 because he fell. He is really sore and wanted an appointment asap but you are booked until Livia 3, he will not see another physician.     Please advise

## 2024-05-01 NOTE — PROGRESS NOTES
Assessment/Plan   83 years old male who has multiple chronic medical problems as in the active problem list which I assessed today came for a regular follow-up and also discussed about his fall.    He had a accidental fall and has the contusion and hematoma of his right mid lateral thigh.  At this time there is no need for any further intervention.  I advised him to do an x-ray of the hip and the femur but he says that he does not think there is a fracture and he does not want to do it.  He also has the bruises and ecchymosis on his anterior thigh.  Abdominal exam is benign.  He was advised to use ice and also rest.  I will give him back again the pain medication as he is requesting.  [place] report was checked and he had the consent signed previously.  He got some pain medication after his carotid surgery and took it for the pain after now.    His blood pressure is stable and his cardiac status with the cardiomyopathy is compensated.  He is continuing his anticoagulation and he understands the importance of fall prevention and other precautions.    His right leg PAD is evaluated by the vascular surgeons at [facility] and they have told him at this time he is high risk for any further intervention.    Patient will be followed up in 3 months time with a lab test as ordered.            Problem List Items Addressed This Visit       Atrial fibrillation (Multi) - Primary    Relevant Orders    CBC and Auto Differential    Cardiomyopathy (Multi)    Chronic lower limb pain    Relevant Medications    HYDROcodone-acetaminophen (Norco) 5-325 mg tablet    Dyslipidemia    Relevant Orders    Comprehensive Metabolic Panel    Essential hypertension, benign    Relevant Orders    CBC and Auto Differential    Peripheral arterial disease (CMS-HCC)    Relevant Medications    HYDROcodone-acetaminophen (Norco) 5-325 mg tablet    Intramuscular hematoma    Relevant Medications    arnica 20 % tincture    HYDROcodone-acetaminophen (Norco)  5-325 mg tablet       Subjective     Patient ID: Bautista Mendoza is a 83 y.o. male who presents for Fall and Leg Pain (left).    History of present illness  83 years old male who lives with his spouse came for a follow-up visit.  His scheduled regular visit was canceled because patient could not make it due to an accidental fall about 2 weeks ago.  The appointment was about 2 days after this fall and he decided not to come because of the left leg pain.    Patient was working in the garage and the cars as usual and he says that he was trying to change a tire.  He was trying to put the tire in the rim of the wheel and on the process he slipped and fell forward but did not have any head injury.  He had keys in his shorts pocket and the case made a contusion in his right lateral thigh and was painful.  He says he had pain and he decided not to move around too much.  The pain has improved but he took some pain medication and Tylenol.    Today he says the pain is tolerable but he wants some Percocet which she gets for his claudication pain in his right leg to be refilled.    He is taking rest of the medications including the Xarelto.    He denies any history of chest pain or short of breath.  No urine bowel changes.    He has had the blood test done prior to the fall and he wants to review them.    Rest of the review of systems he continues to have the right leg claudication pain with any walking.  His walking is limited.  But he likes to be active and he says that that is why he was working on the car.    He will be staying in town without going to [place] anymore he was in the winter months.    Rest of the review of systems no acute complaints.    Family History   Problem Relation Name Age of Onset    Cancer Mother        Social History     Socioeconomic History    Marital status:      Spouse name: Not on file    Number of children: Not on file    Years of education: Not on file    Highest education level: Not on  file   Occupational History    Not on file   Tobacco Use    Smoking status: Former     Current packs/day: 0.00     Average packs/day: 0.3 packs/day for 45.0 years (11.3 ttl pk-yrs)     Types: Cigarettes     Start date:      Quit date: 2000     Years since quittin.3    Smokeless tobacco: Never   Vaping Use    Vaping status: Never Used   Substance and Sexual Activity    Alcohol use: Not Currently     Comment: occassion    Drug use: Never    Sexual activity: Not on file   Other Topics Concern    Not on file   Social History Narrative    Not on file     Social Determinants of Health     Financial Resource Strain: Low Risk  (2023)    Received from [facility]    Overall Financial Resource Strain (CARDIA)     Difficulty of Paying Living Expenses: Not hard at all   Food Insecurity: No Food Insecurity (2023)    Received from [facility]    Hunger Vital Sign     Worried About Running Out of Food in the Last Year: Never true     Ran Out of Food in the Last Year: Never true   Transportation Needs: No Transportation Needs (2023)    Received from [facility]    PRAPARE - Transportation     Lack of Transportation (Medical): No     Lack of Transportation (Non-Medical): No   Physical Activity: Not on file   Stress: Not on file   Social Connections: Not on file   Intimate Partner Violence: Not on file   Housing Stability: Unknown (2023)    Received from [facility]    Housing Stability Vital Sign     Unable to Pay for Housing in the Last Year: No     Number of Places Lived in the Last Year: Not on file     Unstable Housing in the Last Year: No      Hydromorphone   Current Outpatient Medications   Medication Sig Dispense Refill    amiodarone (Pacerone) 200 mg tablet Take 1 tablet (200 mg) by mouth 2 times a day.      aspirin 81 mg EC tablet Take 1 tablet (81 mg) by mouth once daily.      atorvastatin (Lipitor) 40 mg tablet TAKE 1 TABLET BY MOUTH AT  BEDTIME 90 tablet 3     cholecalciferol (Vitamin D-3) 50 MCG (2000 UT) tablet Take 1 tablet (2,000 Units) by mouth once daily.      cyclobenzaprine (Flexeril) 5 mg tablet Take 1 tablet (5 mg) by mouth once daily at bedtime. 30 tablet 1    docusate sodium (Colace) 100 mg capsule TAKE 1 CAPSULE BY MOUTH TWICE DAILY 180 capsule 1    FeroSuL 325 mg (65 mg iron) tablet TAKE 1 TABLET BY MOUTH DAILY AS DIRECTED 90 tablet 1    fluticasone propion-salmeteroL (Advair Diskus) 250-50 mcg/dose diskus inhaler INHALE 1 PUFF BY MOUTH EVERY 12 HOURS 180 each 1    lisinopril 10 mg tablet TAKE 1 TABLET(10 MG) BY MOUTH EVERY DAY 90 tablet 1    metoprolol tartrate (Lopressor) 25 mg tablet Take 0.5 tablets (12.5 mg) by mouth once daily in the morning. 360 tablet 3    Xarelto 20 mg tablet Take 1 tablet (20 mg) by mouth once daily in the evening. 90 tablet 1    arnica 20 % tincture Apply 2 mL topically 2 times a day. 120 mL 1    HYDROcodone-acetaminophen (Norco) 5-325 mg tablet Take 1 tablet by mouth every 4 hours if needed for severe pain (7 - 10) for up to 10 days. 50 tablet 0     No current facility-administered medications for this visit.        Objective     Vitals:    05/01/24 1304   BP: 151/82   Pulse: 62        Physical Exam  Normal-built, well-nourished with no apparent distress. Alert oriented  Skin: Normal turgor. No rash.  Head: Normocephalic, atraumatic.  Eyes: Pupils are equal, round,.  No pallor of conjunctivae.  Mucous membranes are moist  Neck: Supple. No JVD.  Left carotid postoperative scar has no evidence of infection. No thyromegaly. No cervical lymphadenopathy.    Left neck scar healed. Right neck scar healing.   No clubbing, has significant peripheral osteoarthritis noted,   Chest: Vesicular breathing Bilaterally moderate air entry and rare wheezing.  No crackles.  Heart: Irregular rate and rhythm controlled. S1, S2 positive.   Abdomen: Soft and nontender. Bowel sounds are positive. No organomegaly.  Extremities: Right leg no pedal pitting  edema.  Right leg knee has a superficial abrasion measured only about 2 mm in length  Right leg in the anterior mid thigh area he has superficial bruising measured 6 cm x 4 cm.  On mid lateral thigh patient has a hematoma in the muscle area measured about 2 cm x 3 cm.  In this area has no warmness or significant tenderness except the muscle hematoma has some discomfort on palpation.  Hip movements are full.  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  Patient with this injury patient is able to bear weight on the left leg with amputation and the prosthesis.  Right leg pedal pulses are diminished but no evidence of acute ischemia  Neuro Exam: No focal signs. Able to walk with the prostheses independently.        Problem List Items Addressed This Visit       Atrial fibrillation (Multi) - Primary    Relevant Orders    CBC and Auto Differential    Cardiomyopathy (Multi)    Chronic lower limb pain    Relevant Medications    HYDROcodone-acetaminophen (Norco) 5-325 mg tablet    Dyslipidemia    Relevant Orders    Comprehensive Metabolic Panel    Essential hypertension, benign    Relevant Orders    CBC and Auto Differential    Peripheral arterial disease (CMS-HCC)    Relevant Medications    HYDROcodone-acetaminophen (Norco) 5-325 mg tablet    Intramuscular hematoma    Relevant Medications    arnica 20 % tincture    HYDROcodone-acetaminophen (Norco) 5-325 mg tablet        Orders Placed This Encounter   Procedures    CBC and Auto Differential     Standing Status:   Future     Standing Expiration Date:   5/1/2025     Order Specific Question:   Release result to [employer]     Answer:   Immediate    Comprehensive Metabolic Panel     Standing Status:   Future     Standing Expiration Date:   5/1/2025     Order Specific Question:   Release result to [employer]     Answer:   Immediate        Lab Results   Component Value Date    WBC 8.9 04/19/2024    HGB 13.8 04/19/2024    HCT 42.3 04/19/2024     04/19/2024    CHOL 113  04/19/2024    TRIG 56 04/19/2024    HDL 55.5 04/19/2024    ALT 15 04/19/2024    AST 17 04/19/2024     04/19/2024    K 4.6 04/19/2024     04/19/2024    CREATININE 0.91 04/19/2024    BUN 18 04/19/2024    CO2 29 04/19/2024    TSH 2.23 05/13/2023    PSA 0.60 05/08/2019    HGBA1C 5.9 (H) 12/04/2023     Lab Results   Component Value Date    CHOL 113 04/19/2024    LDLCALC 46 04/19/2024    CHHDL 2.0 04/19/2024       No results found.         Latest Reference Range & Units 12/04/23 09:29 04/19/24 09:12   GLUCOSE 74 - 99 mg/dL  104 (H)   SODIUM 136 - 145 mmol/L  136   POTASSIUM 3.5 - 5.3 mmol/L  4.6   CHLORIDE 98 - 107 mmol/L  100   Bicarbonate 21 - 32 mmol/L  29   Anion Gap 10 - 20 mmol/L  12   Blood Urea Nitrogen 6 - 23 mg/dL  18   Creatinine 0.50 - 1.30 mg/dL  0.91   EGFR >60 mL/min/1.73m*2  85   Calcium 8.6 - 10.3 mg/dL  8.8   Albumin 3.4 - 5.0 g/dL  4.1   Alkaline Phosphatase 33 - 136 U/L  63   ALT 10 - 52 U/L  15   AST 9 - 39 U/L  17   Bilirubin Total 0.0 - 1.2 mg/dL  0.7   HDL CHOLESTEROL mg/dL  55.5   Cholesterol/HDL Ratio   2.0   LDL Calculated <=99 mg/dL  46   VLDL 0 - 40 mg/dL  11   TRIGLYCERIDES 0 - 149 mg/dL  56   Non HDL Cholesterol 0 - 149 mg/dL  58   Total Protein 6.4 - 8.2 g/dL  6.9   CHOLESTEROL 0 - 199 mg/dL  113   Hemoglobin A1C 4.3 - 5.6 % 5.9 (H) (E)    Estimated Average Glucose mg/dL 123 (E)    LEUKOCYTES (10*3/UL) IN BLOOD BY AUTOMATED COUNT, Polish 4.4 - 11.3 x10*3/uL  8.9   nRBC 0.0 - 0.0 /100 WBCs  0.0   ERYTHROCYTES (10*6/UL) IN BLOOD BY AUTOMATED COUNT, Polish 4.50 - 5.90 x10*6/uL  4.46 (L)   HEMOGLOBIN 13.5 - 17.5 g/dL  13.8   HEMATOCRIT 41.0 - 52.0 %  42.3   MCV 80 - 100 fL  95   MCH 26.0 - 34.0 pg  30.9   MCHC 32.0 - 36.0 g/dL  32.6   RED CELL DISTRIBUTION WIDTH 11.5 - 14.5 %  14.0   PLATELETS (10*3/UL) IN BLOOD AUTOMATED COUNT, Polish 150 - 450 x10*3/uL  235   NEUTROPHILS/100 LEUKOCYTES IN BLOOD BY AUTOMATED COUNT, Polish 40.0 - 80.0 %  46.8   Immature Granulocytes %,  Automated 0.0 - 0.9 %  0.4   Lymphocytes % 13.0 - 44.0 %  36.5   Monocytes % 2.0 - 10.0 %  12.4   Eosinophils % 0.0 - 6.0 %  3.3   Basophils % 0.0 - 2.0 %  0.6   NEUTROPHILS (10*3/UL) IN BLOOD BY AUTOMATED COUNT, Ethiopian 1.60 - 5.50 x10*3/uL  4.17   Immature Granulocytes Absolute, Automated 0.00 - 0.50 x10*3/uL  0.04   Lymphocytes Absolute 0.80 - 3.00 x10*3/uL  3.26 (H)   Monocytes Absolute 0.05 - 0.80 x10*3/uL  1.11 (H)   Eosinophils Absolute 0.00 - 0.40 x10*3/uL  0.29   Basophils Absolute 0.00 - 0.10 x10*3/uL  0.05   (H): Data is abnormally high  (L): Data is abnormally low  (E): External lab result        Patient was identified as a fall risk. Risk prevention instructions provided.

## 2024-05-20 DIAGNOSIS — M62.838 MUSCLE SPASM: ICD-10-CM

## 2024-05-21 RX ORDER — CYCLOBENZAPRINE HCL 5 MG
5 TABLET ORAL NIGHTLY
Qty: 90 TABLET | Refills: 0 | Status: SHIPPED | OUTPATIENT
Start: 2024-05-21

## 2024-05-22 ENCOUNTER — PATIENT OUTREACH (OUTPATIENT)
Dept: PRIMARY CARE | Facility: CLINIC | Age: 84
End: 2024-05-22
Payer: MEDICARE

## 2024-05-22 NOTE — PROGRESS NOTES
Final call. Called and spoke with patient's wife, Pily to address and questions or concerns regarding hospitalization.   Patient reports their condition has returned to baseline.   Patient encouraged to keep my contact information in case any needs arise.

## 2024-06-12 ENCOUNTER — LAB (OUTPATIENT)
Dept: LAB | Facility: LAB | Age: 84
End: 2024-06-12
Payer: MEDICARE

## 2024-06-12 ENCOUNTER — TELEPHONE (OUTPATIENT)
Dept: PRIMARY CARE | Facility: CLINIC | Age: 84
End: 2024-06-12

## 2024-06-12 DIAGNOSIS — I48.0 PAROXYSMAL ATRIAL FIBRILLATION (MULTI): ICD-10-CM

## 2024-06-12 DIAGNOSIS — E78.5 DYSLIPIDEMIA: ICD-10-CM

## 2024-06-12 DIAGNOSIS — I10 ESSENTIAL HYPERTENSION, BENIGN: ICD-10-CM

## 2024-06-12 LAB
ALBUMIN SERPL BCP-MCNC: 4.1 G/DL (ref 3.4–5)
ALP SERPL-CCNC: 60 U/L (ref 33–136)
ALT SERPL W P-5'-P-CCNC: 17 U/L (ref 10–52)
ANION GAP SERPL CALC-SCNC: 13 MMOL/L (ref 10–20)
AST SERPL W P-5'-P-CCNC: 17 U/L (ref 9–39)
BASOPHILS # BLD AUTO: 0.05 X10*3/UL (ref 0–0.1)
BASOPHILS NFR BLD AUTO: 0.4 %
BILIRUB SERPL-MCNC: 0.5 MG/DL (ref 0–1.2)
BUN SERPL-MCNC: 15 MG/DL (ref 6–23)
CALCIUM SERPL-MCNC: 9.1 MG/DL (ref 8.6–10.3)
CHLORIDE SERPL-SCNC: 100 MMOL/L (ref 98–107)
CO2 SERPL-SCNC: 29 MMOL/L (ref 21–32)
CREAT SERPL-MCNC: 0.87 MG/DL (ref 0.5–1.3)
EGFRCR SERPLBLD CKD-EPI 2021: 86 ML/MIN/1.73M*2
EOSINOPHIL # BLD AUTO: 0.19 X10*3/UL (ref 0–0.4)
EOSINOPHIL NFR BLD AUTO: 1.7 %
ERYTHROCYTE [DISTWIDTH] IN BLOOD BY AUTOMATED COUNT: 14.3 % (ref 11.5–14.5)
GLUCOSE SERPL-MCNC: 96 MG/DL (ref 74–99)
HCT VFR BLD AUTO: 44.4 % (ref 41–52)
HGB BLD-MCNC: 14.5 G/DL (ref 13.5–17.5)
IMM GRANULOCYTES # BLD AUTO: 0.04 X10*3/UL (ref 0–0.5)
IMM GRANULOCYTES NFR BLD AUTO: 0.4 % (ref 0–0.9)
LYMPHOCYTES # BLD AUTO: 3.62 X10*3/UL (ref 0.8–3)
LYMPHOCYTES NFR BLD AUTO: 32.3 %
MCH RBC QN AUTO: 31.6 PG (ref 26–34)
MCHC RBC AUTO-ENTMCNC: 32.7 G/DL (ref 32–36)
MCV RBC AUTO: 97 FL (ref 80–100)
MONOCYTES # BLD AUTO: 1.15 X10*3/UL (ref 0.05–0.8)
MONOCYTES NFR BLD AUTO: 10.3 %
NEUTROPHILS # BLD AUTO: 6.15 X10*3/UL (ref 1.6–5.5)
NEUTROPHILS NFR BLD AUTO: 54.9 %
NRBC BLD-RTO: 0 /100 WBCS (ref 0–0)
PLATELET # BLD AUTO: 232 X10*3/UL (ref 150–450)
POTASSIUM SERPL-SCNC: 4.3 MMOL/L (ref 3.5–5.3)
PROT SERPL-MCNC: 6.8 G/DL (ref 6.4–8.2)
RBC # BLD AUTO: 4.59 X10*6/UL (ref 4.5–5.9)
SODIUM SERPL-SCNC: 138 MMOL/L (ref 136–145)
WBC # BLD AUTO: 11.2 X10*3/UL (ref 4.4–11.3)

## 2024-06-12 PROCEDURE — 36415 COLL VENOUS BLD VENIPUNCTURE: CPT

## 2024-06-12 PROCEDURE — 85025 COMPLETE CBC W/AUTO DIFF WBC: CPT

## 2024-06-12 PROCEDURE — 80053 COMPREHEN METABOLIC PANEL: CPT

## 2024-06-12 NOTE — TELEPHONE ENCOUNTER
----- Message from Dewey Higgins MD sent at 6/12/2024 12:16 PM EDT -----  Results are acceptable.  We will review the results in detail during office follow-up and please advise patient to keep the follow-up appointment as scheduled.

## 2024-06-18 DIAGNOSIS — I10 ESSENTIAL HYPERTENSION, BENIGN: ICD-10-CM

## 2024-06-19 RX ORDER — LISINOPRIL 10 MG/1
10 TABLET ORAL DAILY
Qty: 90 TABLET | Refills: 1 | Status: SHIPPED | OUTPATIENT
Start: 2024-06-19

## 2024-06-25 DIAGNOSIS — I48.0 PAROXYSMAL ATRIAL FIBRILLATION (MULTI): ICD-10-CM

## 2024-06-25 DIAGNOSIS — I10 ESSENTIAL HYPERTENSION, BENIGN: ICD-10-CM

## 2024-06-26 ENCOUNTER — APPOINTMENT (OUTPATIENT)
Dept: PRIMARY CARE | Facility: CLINIC | Age: 84
End: 2024-06-26
Payer: MEDICARE

## 2024-06-26 RX ORDER — LISINOPRIL 10 MG/1
10 TABLET ORAL DAILY
Qty: 90 TABLET | Refills: 3 | Status: SHIPPED | OUTPATIENT
Start: 2024-06-26

## 2024-06-26 RX ORDER — RIVAROXABAN 20 MG/1
20 TABLET, FILM COATED ORAL EVERY EVENING
Qty: 90 TABLET | Refills: 1 | Status: SHIPPED | OUTPATIENT
Start: 2024-06-26

## 2024-07-25 DIAGNOSIS — D64.9 ANEMIA, UNSPECIFIED TYPE: ICD-10-CM

## 2024-07-25 RX ORDER — FERROUS SULFATE TAB 325 MG (65 MG ELEMENTAL FE) 325 (65 FE) MG
1 TAB ORAL DAILY
Qty: 90 TABLET | Refills: 1 | Status: SHIPPED | OUTPATIENT
Start: 2024-07-25

## 2024-08-06 ENCOUNTER — TELEPHONE (OUTPATIENT)
Dept: CARDIOLOGY | Facility: CLINIC | Age: 84
End: 2024-08-06

## 2024-08-06 ENCOUNTER — APPOINTMENT (OUTPATIENT)
Dept: PRIMARY CARE | Facility: CLINIC | Age: 84
End: 2024-08-06
Payer: MEDICARE

## 2024-08-06 VITALS
HEIGHT: 69 IN | WEIGHT: 192 LBS | HEART RATE: 73 BPM | DIASTOLIC BLOOD PRESSURE: 98 MMHG | SYSTOLIC BLOOD PRESSURE: 155 MMHG | BODY MASS INDEX: 28.44 KG/M2

## 2024-08-06 DIAGNOSIS — I73.9 PERIPHERAL ARTERIAL DISEASE (CMS-HCC): ICD-10-CM

## 2024-08-06 DIAGNOSIS — I70.219 ATHEROSCLEROTIC PERIPHERAL VASCULAR DISEASE WITH INTERMITTENT CLAUDICATION (CMS-HCC): ICD-10-CM

## 2024-08-06 DIAGNOSIS — I10 ESSENTIAL HYPERTENSION, BENIGN: ICD-10-CM

## 2024-08-06 DIAGNOSIS — I25.5 ISCHEMIC CARDIOMYOPATHY: ICD-10-CM

## 2024-08-06 DIAGNOSIS — E55.9 VITAMIN D DEFICIENCY: ICD-10-CM

## 2024-08-06 DIAGNOSIS — I73.9 CLAUDICATION OF RIGHT LOWER EXTREMITY (CMS-HCC): ICD-10-CM

## 2024-08-06 DIAGNOSIS — I48.0 PAROXYSMAL ATRIAL FIBRILLATION (MULTI): ICD-10-CM

## 2024-08-06 DIAGNOSIS — M25.551 RIGHT HIP PAIN: ICD-10-CM

## 2024-08-06 DIAGNOSIS — R73.9 HYPERGLYCEMIA: ICD-10-CM

## 2024-08-06 DIAGNOSIS — M47.812 OSTEOARTHRITIS OF CERVICAL SPINE, UNSPECIFIED SPINAL OSTEOARTHRITIS COMPLICATION STATUS: ICD-10-CM

## 2024-08-06 DIAGNOSIS — R73.09 ELEVATED HEMOGLOBIN A1C: ICD-10-CM

## 2024-08-06 DIAGNOSIS — E78.5 DYSLIPIDEMIA: ICD-10-CM

## 2024-08-06 DIAGNOSIS — I72.4 FEMORAL ARTERY ANEURYSM, RIGHT (CMS-HCC): ICD-10-CM

## 2024-08-06 DIAGNOSIS — Z00.00 ROUTINE GENERAL MEDICAL EXAMINATION AT HEALTH CARE FACILITY: Primary | ICD-10-CM

## 2024-08-06 PROCEDURE — G0439 PPPS, SUBSEQ VISIT: HCPCS | Performed by: INTERNAL MEDICINE

## 2024-08-06 PROCEDURE — G0009 ADMIN PNEUMOCOCCAL VACCINE: HCPCS | Performed by: INTERNAL MEDICINE

## 2024-08-06 PROCEDURE — 1160F RVW MEDS BY RX/DR IN RCRD: CPT | Performed by: INTERNAL MEDICINE

## 2024-08-06 PROCEDURE — 3077F SYST BP >= 140 MM HG: CPT | Performed by: INTERNAL MEDICINE

## 2024-08-06 PROCEDURE — 90677 PCV20 VACCINE IM: CPT | Performed by: INTERNAL MEDICINE

## 2024-08-06 PROCEDURE — 3080F DIAST BP >= 90 MM HG: CPT | Performed by: INTERNAL MEDICINE

## 2024-08-06 PROCEDURE — 1159F MED LIST DOCD IN RCRD: CPT | Performed by: INTERNAL MEDICINE

## 2024-08-06 PROCEDURE — 1123F ACP DISCUSS/DSCN MKR DOCD: CPT | Performed by: INTERNAL MEDICINE

## 2024-08-06 PROCEDURE — 99497 ADVNCD CARE PLAN 30 MIN: CPT | Performed by: INTERNAL MEDICINE

## 2024-08-06 PROCEDURE — 1158F ADVNC CARE PLAN TLK DOCD: CPT | Performed by: INTERNAL MEDICINE

## 2024-08-06 PROCEDURE — 99213 OFFICE O/P EST LOW 20 MIN: CPT | Performed by: INTERNAL MEDICINE

## 2024-08-06 PROCEDURE — 1170F FXNL STATUS ASSESSED: CPT | Performed by: INTERNAL MEDICINE

## 2024-08-06 RX ORDER — ACETAMINOPHEN 500 MG
2 TABLET ORAL EVERY 6 HOURS PRN
COMMUNITY

## 2024-08-06 ASSESSMENT — LIFESTYLE VARIABLES
SKIP TO QUESTIONS 9-10: 0
HAS A RELATIVE, FRIEND, DOCTOR, OR ANOTHER HEALTH PROFESSIONAL EXPRESSED CONCERN ABOUT YOUR DRINKING OR SUGGESTED YOU CUT DOWN: NO
HOW MANY STANDARD DRINKS CONTAINING ALCOHOL DO YOU HAVE ON A TYPICAL DAY: 3 OR 4
HOW OFTEN DO YOU HAVE A DRINK CONTAINING ALCOHOL: 4 OR MORE TIMES A WEEK
HAVE YOU OR SOMEONE ELSE BEEN INJURED AS A RESULT OF YOUR DRINKING: NO
AUDIT-C TOTAL SCORE: 5
AUDIT TOTAL SCORE: -1
HOW OFTEN DO YOU HAVE SIX OR MORE DRINKS ON ONE OCCASION: NEVER

## 2024-08-06 ASSESSMENT — PATIENT HEALTH QUESTIONNAIRE - PHQ9
1. LITTLE INTEREST OR PLEASURE IN DOING THINGS: NOT AT ALL
SUM OF ALL RESPONSES TO PHQ9 QUESTIONS 1 AND 2: 0
2. FEELING DOWN, DEPRESSED OR HOPELESS: NOT AT ALL

## 2024-08-06 ASSESSMENT — ACTIVITIES OF DAILY LIVING (ADL)
DOING_HOUSEWORK: INDEPENDENT
MANAGING_FINANCES: TOTAL CARE
DRESSING: INDEPENDENT
TAKING_MEDICATION: INDEPENDENT
BATHING: INDEPENDENT
GROCERY_SHOPPING: INDEPENDENT

## 2024-08-06 NOTE — PROGRESS NOTES
"Subjective :  Chief Complaint: Bautista Mendoza is an 84 y.o. male here for an annual wellness visit and general medical care and f/u.     HPI:  84 years old male who lives with his spouse has had a left leg prosthesis because of his previous amputation from peripheral arterial disease has right leg significant peripheral arterial disease and had intervention at [facility] came for a follow-up visit and also annual wellness visit.  Patient follows with the [facility] cardiology and vascular department regularly.    He has chronic pain from his right leg but he is still functional he does some yard work and he does work in his garage.  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    He has had no further episodes of falls.  He uses a cane at home but he did not bring the cane to the office today because he says he is feeling steady.  On the days he feels weak he will use the cane.    His arthritis is acting up on and off but he says his pain is reasonably under control at this time.    He denies any headache no chest pain no urine or bowel changes.      1.  Immunizations  Prevnar 20, ORDERED  shingles, discussed  Tdap, discussed  RSV, seasonal  FLU seasonal  COVID seasonal    2.  Colon screening 2019    3.  Eye exam 1 year ago.    4.  Hearing test needs.    5.  Prostate deferred    6.  Living will discussed    7.  Fall prevention discussed    8.  Bone density patient doesn't want    9.  Weight and gait discussed    10.  Skin care and dermatology deferred    12. CAD  Had open heart surgery.    13. Lung screenig has had it done in the past.    14. Depression screen done    15. Blood Screen   Deferred    All systems reviewed and negative except for what was mentioned in the HPI    Objective   BP (!) 155/98   Pulse 73   Ht 1.753 m (5' 9\")   Wt 87.1 kg (192 lb)   BMI 28.35 kg/m²       PHYSICAL EXAM  Normal-built, well-nourished with no " apparent distress. Alert oriented  Skin: Normal turgor. No rash.  Memory is intact, mood is appropriate  Has slightly decreased hearing.  Head: Normocephalic, atraumatic.  Eyes: Pupils are equal, round,.  No pallor of conjunctivae.  Mucous membranes are moist  Neck: Supple. No JVD.  Left carotid postoperative scar has no evidence of infection. No thyromegaly. No cervical lymphadenopathy.    Left neck scar healed. Right neck scar healing.   No clubbing, has significant peripheral osteoarthritis noted,   Chest: Vesicular breathing Bilaterally moderate air entry and rare wheezing.  No crackles.  Heart: Irregular rate and rhythm controlled. S1, S2 positive.   Abdomen: Soft and nontender. Bowel sounds are positive. No organomegaly.  Extremities: Right leg no pedal pitting edema.    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  Patient with this injury patient is able to bear weight on the left leg with amputation and the prosthesis.  Right leg pedal pulses are diminished but no evidence of acute ischemia  Neuro Exam: No focal signs. Able to walk with the prostheses independently.    Imaging:  No results found.     Labs reviewed:    Lab Results   Component Value Date    WBC 11.2 06/12/2024    HGB 14.5 06/12/2024    HCT 44.4 06/12/2024     06/12/2024    CHOL 113 04/19/2024    TRIG 56 04/19/2024    HDL 55.5 04/19/2024    ALT 17 06/12/2024    AST 17 06/12/2024     06/12/2024    K 4.3 06/12/2024     06/12/2024    CREATININE 0.87 06/12/2024    BUN 15 06/12/2024    CO2 29 06/12/2024    TSH 2.23 05/13/2023    PSA 0.60 05/08/2019    HGBA1C 5.9 (H) 12/04/2023       Past Medical, Surgical, and Family History reviewed and updated in chart.    I have reviewed and reconciled the medication list with the patient today.   Current Outpatient Medications:     acetaminophen (Tylenol) 500 mg tablet, Take 2 tablets (1,000 mg) by mouth every 6 hours if needed for mild pain (1 - 3)., Disp: , Rfl:     amiodarone  (Pacerone) 200 mg tablet, Take 1 tablet (200 mg) by mouth 2 times a day., Disp: , Rfl:     aspirin 81 mg EC tablet, Take 1 tablet (81 mg) by mouth once daily., Disp: , Rfl:     atorvastatin (Lipitor) 40 mg tablet, TAKE 1 TABLET BY MOUTH AT  BEDTIME, Disp: 90 tablet, Rfl: 3    cholecalciferol (Vitamin D-3) 50 MCG (2000 UT) tablet, Take 1 tablet (2,000 Units) by mouth once daily., Disp: , Rfl:     cyclobenzaprine (Flexeril) 5 mg tablet, TAKE 1 TABLET(5 MG) BY MOUTH DAILY AT BEDTIME, Disp: 90 tablet, Rfl: 0    docusate sodium (Colace) 100 mg capsule, TAKE 1 CAPSULE BY MOUTH TWICE DAILY, Disp: 180 capsule, Rfl: 1    FeroSuL tablet, TAKE 1 TABLET BY MOUTH DAILY AS DIRECTED, Disp: 90 tablet, Rfl: 1    fluticasone propion-salmeteroL (Advair Diskus) 250-50 mcg/dose diskus inhaler, INHALE 1 PUFF BY MOUTH EVERY 12 HOURS, Disp: 180 each, Rfl: 1    lisinopril 10 mg tablet, TAKE 1 TABLET(10 MG) BY MOUTH EVERY DAY, Disp: 90 tablet, Rfl: 3    Xarelto 20 mg tablet, TAKE 1 TABLET BY MOUTH ONCE  DAILY IN THE EVENING, Disp: 90 tablet, Rfl: 1    arnica 20 % tincture, Apply 2 mL topically 2 times a day., Disp: 120 mL, Rfl: 1    metoprolol tartrate (Lopressor) 25 mg tablet, Take 0.5 tablets (12.5 mg) by mouth once daily in the morning., Disp: 360 tablet, Rfl: 3     List of current healthcare providers:  Patient Care Team:  Dewey Higgins MD as PCP - General (Internal Medicine)  [facility] Higgins [facility]  Jesus Herron MD as Referring Physician     HRA:     Over the past 2 weeks, how often have you been bothered by any of the following problems?  Little interest or pleasure in doing things: Not at all  Feeling down, depressed, or hopeless: Not at all  Patient Health Questionnaire-2 Score: 0     Audit Alcohol Screening  Q1: How often do you have a drink containing alcohol?: 4 or more times a week  Q2: How many drinks containing alcohol do you have on a typical day when you are drinking?: 3 or 4  Q3: How often do you  have six or more drinks on one occasion?: Never  Audit-C Score: 5     Nutrition and Exercise  Current Diet: Unhealthy Diet, Well Balanced Diet  Adequate Fluid Intake: Yes  Caffeine: No  Exercise Frequency: Regularly  Home Safety Risk Factors: None  Functional Ability/Level of Safety  Cognitive Impairment Observed: No cognitive impairment observed  Patient Self Assessment of Health Status  Patient Self Assessment: Fair    Assessment/Plan :  84 years old male who has advanced atherosclerotic disease lives with his spouse came for a annual wellness visit and his preventive care were reviewed and discussed in detail.  Some of the invasive testings will be deferred because of his chronic and advanced atherosclerotic disease for which he follows up with the [facility].    Patient is functional and he wants to stay functional.  The fall prevention was discussed in detail.  His arthritis is reasonably controlled.    Patient's claudication is still a problem in his right leg but no further intervention could be done.  He has stopped smoking and he is trying to have his lifestyle changed.    Patient's lab tests were reviewed and discussed.    Patient has elevated blood pressure at this time he does not want to add any medication and he says he is going to talk to the cardiologist.  He will continue to monitor his blood pressure and he will follow-up with me in about 4 to 6 weeks to have a reevaluation.  Patient's management was discussed including his advance care planning was reviewed with the patient and his spouse who is the healthcare power of .        Problem List Items Addressed This Visit       Atrial fibrillation (Multi)    Relevant Orders    TSH with reflex to Free T4 if abnormal    Cardiomyopathy (Multi)    Relevant Orders    TSH with reflex to Free T4 if abnormal    Cervical osteoarthritis    Claudication of right lower extremity (CMS-HCC)    Dyslipidemia    Relevant Orders    Comprehensive Metabolic  Panel    Lipid Panel    Elevated hemoglobin A1c    Essential hypertension, benign    Relevant Orders    CBC and Auto Differential    Femoral artery aneurysm, right (CMS-HCC)    Hyperglycemia    Relevant Orders    Hemoglobin A1C    Atherosclerotic peripheral vascular disease with intermittent claudication (CMS-HCC)    Peripheral arterial disease (CMS-HCC)    Right hip pain    Vitamin D deficiency    Relevant Orders    Vitamin D 25-Hydroxy,Total (for eval of Vitamin D levels)     Other Visit Diagnoses       Routine general medical examination at health care facility    -  Primary          The following health maintenance schedule was reviewed with the patient and provided in printed form in the after visit summary:  Health Maintenance   Topic Date Due    Medicare Annual Wellness Visit (AWV)  Never done    Hepatitis A Vaccines (1 of 2 - Risk 2-dose series) Never done    Zoster Vaccines (1 of 2) Never done    RSV Pregnant patients and/or  patients aged 60+ years (1 - 1-dose 60+ series) Never done    Hepatitis B Vaccines (1 of 3 - Risk 3-dose series) Never done    COVID-19 Vaccine (1 - 2023-24 season) Never done    Influenza Vaccine (1) 09/01/2024    TSH Level  10/26/2024    Diabetes: Hemoglobin A1C  12/04/2024    Diabetes Screening  12/04/2024    DTaP/Tdap/Td Vaccines (2 - Td or Tdap) 10/27/2027    Lipid Panel  04/19/2029    Pneumococcal Vaccine: 65+ Years  Completed    HIB Vaccines  Aged Out    IPV Vaccines  Aged Out    Meningococcal Vaccine  Aged Out    Rotavirus Vaccines  Aged Out    HPV Vaccines  Aged Out    Irritable Bowel Syndrome  Discontinued       Advance Care Planning   Advance Care Planning Note     Discussion Date: 08/08/24   Discussion Participants: patient    The patient wishes to discuss Advance Care Planning today and the following is a brief summary of our discussion.     Patient has capacity to make their own medical decisions: Yes  Health Care Agent/Surrogate Decision Maker documented in chart:  Yes    Documents on file and valid:  Advance Directive/Living Will: No   Health Care Power of : Yes  Other: discussed    Communication of Medical Status/Prognosis:   guarded     Communication of Treatment Goals/Options:   discussed     Treatment Decisions  Goals of Care: quality of life is prioritized; willing to accept low-burden treatments to achieve meaningful goals   Patient did and will talk to wife  Follow Up Plan  Will bring the paper  Team Members  Wife, children  Time Statement: Total face to face time spent on advance care planning was 19 minutes with 12 minutes spent in counseling, including the explanation.    Dewey Higgins MD  8/8/2024 12:51 PM             Orders Placed This Encounter   Procedures    Pneumococcal conjugate vaccine, 20-valent (PREVNAR 20)    CBC and Auto Differential     Standing Status:   Future     Standing Expiration Date:   2/6/2025     Order Specific Question:   Release result to MyChart     Answer:   Immediate    Comprehensive Metabolic Panel     Standing Status:   Future     Standing Expiration Date:   2/6/2025     Order Specific Question:   Release result to MyChart     Answer:   Immediate    Lipid Panel     fasting     Standing Status:   Future     Standing Expiration Date:   2/6/2025     Order Specific Question:   Release result to MyChart     Answer:   Immediate [1]    TSH with reflex to Free T4 if abnormal     Standing Status:   Future     Standing Expiration Date:   2/6/2025     Order Specific Question:   Release result to MyChart     Answer:   Immediate    Hemoglobin A1C     Standing Status:   Future     Standing Expiration Date:   2/6/2025     Order Specific Question:   Release result to MyChart     Answer:   Immediate    Vitamin D 25-Hydroxy,Total (for eval of Vitamin D levels)     Standing Status:   Future     Standing Expiration Date:   2/6/2025     Order Specific Question:   Release result to MyChart     Answer:   Immediate    Full code     Order Specific  Question:   Plan of Care     Answer:   Code Status Discussion Completed     Order Specific Question:   Decision Maker     Answer:   Patient       Continue current medications as listed  Follow up in 3 months.

## 2024-08-11 DIAGNOSIS — M62.838 MUSCLE SPASM: ICD-10-CM

## 2024-08-13 RX ORDER — CYCLOBENZAPRINE HCL 5 MG
5 TABLET ORAL NIGHTLY
Qty: 90 TABLET | Refills: 1 | Status: SHIPPED | OUTPATIENT
Start: 2024-08-13

## 2024-10-01 ENCOUNTER — TELEPHONE (OUTPATIENT)
Dept: PRIMARY CARE | Facility: CLINIC | Age: 84
End: 2024-10-01
Payer: MEDICARE

## 2024-10-01 DIAGNOSIS — M47.812 OSTEOARTHRITIS OF CERVICAL SPINE, UNSPECIFIED SPINAL OSTEOARTHRITIS COMPLICATION STATUS: ICD-10-CM

## 2024-10-01 DIAGNOSIS — M25.552 LEFT HIP PAIN: Primary | ICD-10-CM

## 2024-10-01 NOTE — TELEPHONE ENCOUNTER
Patient's wife called office requesting referral for Orthopedic DrJose E Would like to use Dr. Cam Henriquez. Fax #: [phone]

## 2024-10-10 DIAGNOSIS — I10 ESSENTIAL HYPERTENSION, BENIGN: ICD-10-CM

## 2024-10-10 RX ORDER — METOPROLOL TARTRATE 25 MG/1
12.5 TABLET, FILM COATED ORAL EVERY MORNING
Qty: 360 TABLET | Refills: 3 | Status: SHIPPED | OUTPATIENT
Start: 2024-10-10

## 2024-11-12 ENCOUNTER — TELEPHONE (OUTPATIENT)
Dept: PRIMARY CARE | Facility: CLINIC | Age: 84
End: 2024-11-12

## 2024-11-12 ENCOUNTER — LAB (OUTPATIENT)
Dept: LAB | Facility: LAB | Age: 84
End: 2024-11-12
Payer: MEDICARE

## 2024-11-12 DIAGNOSIS — I25.5 ISCHEMIC CARDIOMYOPATHY: ICD-10-CM

## 2024-11-12 DIAGNOSIS — E78.5 DYSLIPIDEMIA: ICD-10-CM

## 2024-11-12 DIAGNOSIS — R73.9 HYPERGLYCEMIA: ICD-10-CM

## 2024-11-12 DIAGNOSIS — E55.9 VITAMIN D DEFICIENCY: ICD-10-CM

## 2024-11-12 DIAGNOSIS — I10 ESSENTIAL HYPERTENSION, BENIGN: ICD-10-CM

## 2024-11-12 DIAGNOSIS — I48.0 PAROXYSMAL ATRIAL FIBRILLATION (MULTI): ICD-10-CM

## 2024-11-12 LAB
25(OH)D3 SERPL-MCNC: 34 NG/ML (ref 30–100)
ALBUMIN SERPL BCP-MCNC: 3.9 G/DL (ref 3.4–5)
ALP SERPL-CCNC: 66 U/L (ref 33–136)
ALT SERPL W P-5'-P-CCNC: 38 U/L (ref 10–52)
ANION GAP SERPL CALC-SCNC: 12 MMOL/L (ref 10–20)
AST SERPL W P-5'-P-CCNC: 18 U/L (ref 9–39)
BASOPHILS # BLD AUTO: 0.04 X10*3/UL (ref 0–0.1)
BASOPHILS NFR BLD AUTO: 0.2 %
BILIRUB SERPL-MCNC: 0.7 MG/DL (ref 0–1.2)
BUN SERPL-MCNC: 30 MG/DL (ref 6–23)
CALCIUM SERPL-MCNC: 8.8 MG/DL (ref 8.6–10.3)
CHLORIDE SERPL-SCNC: 98 MMOL/L (ref 98–107)
CHOLEST SERPL-MCNC: 130 MG/DL (ref 0–199)
CHOLESTEROL/HDL RATIO: 2
CO2 SERPL-SCNC: 31 MMOL/L (ref 21–32)
CREAT SERPL-MCNC: 0.94 MG/DL (ref 0.5–1.3)
EGFRCR SERPLBLD CKD-EPI 2021: 81 ML/MIN/1.73M*2
EOSINOPHIL # BLD AUTO: 0 X10*3/UL (ref 0–0.4)
EOSINOPHIL NFR BLD AUTO: 0 %
ERYTHROCYTE [DISTWIDTH] IN BLOOD BY AUTOMATED COUNT: 15.7 % (ref 11.5–14.5)
EST. AVERAGE GLUCOSE BLD GHB EST-MCNC: 123 MG/DL
GLUCOSE SERPL-MCNC: 94 MG/DL (ref 74–99)
HBA1C MFR BLD: 5.9 %
HCT VFR BLD AUTO: 40.1 % (ref 41–52)
HDLC SERPL-MCNC: 66.4 MG/DL
HGB BLD-MCNC: 13.5 G/DL (ref 13.5–17.5)
IMM GRANULOCYTES # BLD AUTO: 0.3 X10*3/UL (ref 0–0.5)
IMM GRANULOCYTES NFR BLD AUTO: 1.6 % (ref 0–0.9)
LDLC SERPL CALC-MCNC: 47 MG/DL
LYMPHOCYTES # BLD AUTO: 2.66 X10*3/UL (ref 0.8–3)
LYMPHOCYTES NFR BLD AUTO: 14.5 %
MCH RBC QN AUTO: 31.8 PG (ref 26–34)
MCHC RBC AUTO-ENTMCNC: 33.7 G/DL (ref 32–36)
MCV RBC AUTO: 94 FL (ref 80–100)
MONOCYTES # BLD AUTO: 1.03 X10*3/UL (ref 0.05–0.8)
MONOCYTES NFR BLD AUTO: 5.6 %
NEUTROPHILS # BLD AUTO: 14.37 X10*3/UL (ref 1.6–5.5)
NEUTROPHILS NFR BLD AUTO: 78.1 %
NON HDL CHOLESTEROL: 64 MG/DL (ref 0–149)
NRBC BLD-RTO: 0 /100 WBCS (ref 0–0)
PLATELET # BLD AUTO: 297 X10*3/UL (ref 150–450)
POTASSIUM SERPL-SCNC: 4.3 MMOL/L (ref 3.5–5.3)
PROT SERPL-MCNC: 6.4 G/DL (ref 6.4–8.2)
RBC # BLD AUTO: 4.25 X10*6/UL (ref 4.5–5.9)
SODIUM SERPL-SCNC: 137 MMOL/L (ref 136–145)
TRIGL SERPL-MCNC: 82 MG/DL (ref 0–149)
TSH SERPL-ACNC: 0.45 MIU/L (ref 0.44–3.98)
VLDL: 16 MG/DL (ref 0–40)
WBC # BLD AUTO: 18.4 X10*3/UL (ref 4.4–11.3)

## 2024-11-12 PROCEDURE — 84443 ASSAY THYROID STIM HORMONE: CPT

## 2024-11-12 PROCEDURE — 36415 COLL VENOUS BLD VENIPUNCTURE: CPT

## 2024-11-12 PROCEDURE — 82306 VITAMIN D 25 HYDROXY: CPT

## 2024-11-12 PROCEDURE — 80053 COMPREHEN METABOLIC PANEL: CPT

## 2024-11-12 PROCEDURE — 85025 COMPLETE CBC W/AUTO DIFF WBC: CPT

## 2024-11-12 PROCEDURE — 83036 HEMOGLOBIN GLYCOSYLATED A1C: CPT

## 2024-11-12 PROCEDURE — 80061 LIPID PANEL: CPT

## 2024-11-12 NOTE — TELEPHONE ENCOUNTER
----- Message from Dewey Higgins sent at 11/12/2024  2:14 PM EST -----  Has elevated white cell count.  Rest of the labs acceptable.  Will review and discuss results during the follow-up as scheduled.

## 2024-11-12 NOTE — RESULT ENCOUNTER NOTE
Has elevated white cell count.  Rest of the labs acceptable.  Will review and discuss results during the follow-up as scheduled.

## 2024-11-18 ENCOUNTER — APPOINTMENT (OUTPATIENT)
Dept: PRIMARY CARE | Facility: CLINIC | Age: 84
End: 2024-11-18
Payer: MEDICARE

## 2024-11-18 VITALS
DIASTOLIC BLOOD PRESSURE: 85 MMHG | BODY MASS INDEX: 27.55 KG/M2 | WEIGHT: 186 LBS | SYSTOLIC BLOOD PRESSURE: 116 MMHG | HEIGHT: 69 IN | HEART RATE: 68 BPM

## 2024-11-18 DIAGNOSIS — M62.9 DISORDER OF MUSCLE, UNSPECIFIED: ICD-10-CM

## 2024-11-18 DIAGNOSIS — I10 PRIMARY HYPERTENSION: Primary | ICD-10-CM

## 2024-11-18 DIAGNOSIS — I48.0 PAROXYSMAL ATRIAL FIBRILLATION (MULTI): ICD-10-CM

## 2024-11-18 DIAGNOSIS — I73.9 PERIPHERAL ARTERIAL DISEASE (CMS-HCC): ICD-10-CM

## 2024-11-18 DIAGNOSIS — E55.9 VITAMIN D DEFICIENCY: ICD-10-CM

## 2024-11-18 DIAGNOSIS — Z78.9 SOCIAL ALCOHOL USE: ICD-10-CM

## 2024-11-18 DIAGNOSIS — M25.551 RIGHT HIP PAIN: ICD-10-CM

## 2024-11-18 DIAGNOSIS — R73.09 ELEVATED HEMOGLOBIN A1C: ICD-10-CM

## 2024-11-18 DIAGNOSIS — I25.5 ISCHEMIC CARDIOMYOPATHY: ICD-10-CM

## 2024-11-18 DIAGNOSIS — J42 CHRONIC BRONCHITIS, UNSPECIFIED CHRONIC BRONCHITIS TYPE (MULTI): ICD-10-CM

## 2024-11-18 DIAGNOSIS — D72.829 LEUKOCYTOSIS, UNSPECIFIED TYPE: ICD-10-CM

## 2024-11-18 DIAGNOSIS — I42.0 ISCHEMIC DILATED CARDIOMYOPATHY (MULTI): ICD-10-CM

## 2024-11-18 DIAGNOSIS — I72.4 FEMORAL ARTERY ANEURYSM, RIGHT (CMS-HCC): ICD-10-CM

## 2024-11-18 DIAGNOSIS — I25.5 ISCHEMIC DILATED CARDIOMYOPATHY (MULTI): ICD-10-CM

## 2024-11-18 PROBLEM — I74.3 EMBOLISM AND THROMBOSIS OF ARTERIES OF THE LOWER EXTREMITIES (MULTI): Status: RESOLVED | Noted: 2023-10-12 | Resolved: 2024-11-18

## 2024-11-18 PROBLEM — R07.89 CHEST DISCOMFORT: Status: RESOLVED | Noted: 2022-12-12 | Resolved: 2024-11-18

## 2024-11-18 PROBLEM — I25.118 ATHEROSCLEROTIC HEART DISEASE OF NATIVE CORONARY ARTERY WITH OTHER FORMS OF ANGINA PECTORIS: Status: RESOLVED | Noted: 2024-01-02 | Resolved: 2024-11-18

## 2024-11-18 PROBLEM — I70.219: Status: RESOLVED | Noted: 2019-07-23 | Resolved: 2024-11-18

## 2024-11-18 PROCEDURE — 1159F MED LIST DOCD IN RCRD: CPT | Performed by: INTERNAL MEDICINE

## 2024-11-18 PROCEDURE — 99214 OFFICE O/P EST MOD 30 MIN: CPT | Performed by: INTERNAL MEDICINE

## 2024-11-18 PROCEDURE — 1123F ACP DISCUSS/DSCN MKR DOCD: CPT | Performed by: INTERNAL MEDICINE

## 2024-11-18 PROCEDURE — 3079F DIAST BP 80-89 MM HG: CPT | Performed by: INTERNAL MEDICINE

## 2024-11-18 PROCEDURE — G0008 ADMIN INFLUENZA VIRUS VAC: HCPCS | Performed by: INTERNAL MEDICINE

## 2024-11-18 PROCEDURE — 3074F SYST BP LT 130 MM HG: CPT | Performed by: INTERNAL MEDICINE

## 2024-11-18 PROCEDURE — 1160F RVW MEDS BY RX/DR IN RCRD: CPT | Performed by: INTERNAL MEDICINE

## 2024-11-18 PROCEDURE — 1036F TOBACCO NON-USER: CPT | Performed by: INTERNAL MEDICINE

## 2024-11-18 PROCEDURE — 90662 IIV NO PRSV INCREASED AG IM: CPT | Performed by: INTERNAL MEDICINE

## 2024-11-18 PROCEDURE — G2211 COMPLEX E/M VISIT ADD ON: HCPCS | Performed by: INTERNAL MEDICINE

## 2024-11-18 ASSESSMENT — LIFESTYLE VARIABLES
HAS A RELATIVE, FRIEND, DOCTOR, OR ANOTHER HEALTH PROFESSIONAL EXPRESSED CONCERN ABOUT YOUR DRINKING OR SUGGESTED YOU CUT DOWN: NO
AUDIT-C TOTAL SCORE: 1
HOW MANY STANDARD DRINKS CONTAINING ALCOHOL DO YOU HAVE ON A TYPICAL DAY: 1 OR 2
AUDIT TOTAL SCORE: 1
HOW OFTEN DO YOU HAVE A DRINK CONTAINING ALCOHOL: MONTHLY OR LESS
HAVE YOU OR SOMEONE ELSE BEEN INJURED AS A RESULT OF YOUR DRINKING: NO
HOW OFTEN DO YOU HAVE SIX OR MORE DRINKS ON ONE OCCASION: NEVER
SKIP TO QUESTIONS 9-10: 1

## 2024-11-18 NOTE — ASSESSMENT & PLAN NOTE
>>ASSESSMENT AND PLAN FOR HTN (HYPERTENSION) WRITTEN ON 11/18/2024  9:37 AM BY DUGLAS ORTEGA MD    Blood pressure well-controlled.

## 2024-11-18 NOTE — ASSESSMENT & PLAN NOTE
As noted in the history and physical.  He is following with the orthopedic.  He may need an MRI.  He was treated with steroids and he has a follow-up appointment within next 2 to 3 days with the orthopedic.

## 2024-11-18 NOTE — ASSESSMENT & PLAN NOTE
Discussed the diet control.  He is on steroids intermittently from orthopedic for his hip pain.  He we will follow-up the hemoglobin A1c.

## 2024-11-18 NOTE — PROGRESS NOTES
Assessment/Plan     84 years old male with multiple chronic medical problems as noted below came for a follow-up visit.  He will be followed up in 4 months and if there is any new change he understands with call me or see me sooner.    Problem List Items Addressed This Visit       Atrial fibrillation (Multi)     Rate is controlled and patient is on anticoagulation.  Follows with the cardiologist.         Relevant Orders    Comprehensive Metabolic Panel    Chronic obstructive pulmonary disease (Multi)     Reformed smoker and no evidence of exacerbation.         Elevated hemoglobin A1c     Discussed the diet control.  He is on steroids intermittently from orthopedic for his hip pain.  He we will follow-up the hemoglobin A1c.         Relevant Orders    Hemoglobin A1C    Femoral artery aneurysm, right (CMS-HCC)     Follows vascular surgery with [facility].         Ischemic dilated cardiomyopathy (Multi)     Patient follows with the [facility] and he has had open heart surgery in the past.  Relatively stable at this time with no angina but his activities are limited.  He continues the Xarelto and other medications including statin.  He is a reformed smoker.         Relevant Orders    Comprehensive Metabolic Panel    Lipid Panel    HTN (hypertension) - Primary     Blood pressure well-controlled.         Peripheral arterial disease (CMS-[facility])     Patient follows with the [facility] and he has had revascularization procedures in the past.  Relatively stable at this time with no resting leg pain.  He continues the Xarelto and other medications including statin.  He is a reformed smoker.         Right hip pain     As noted in the history and physical.  He is following with the orthopedic.  He may need an MRI.  He was treated with steroids and he has a follow-up appointment within next 2 to 3 days with the orthopedic.         Vitamin D deficiency     On oral vitamin D and his level is acceptable.          Relevant Orders    CBC and Auto Differential    Vitamin D 25-Hydroxy,Total (for eval of Vitamin D levels)    Social alcohol use     Discussed the risk of alcohol and also importance of minimizing it.         Leukocytosis     Probably due to his steroids which was used by the orthopedic.  No evidence of any infection and will be followed up with the next blood draw.  If there is any change he understands to call me.          Other Visit Diagnoses       Ischemic cardiomyopathy        Relevant Orders    TSH with reflex to Free T4 if abnormal    Disorder of muscle, unspecified        Relevant Orders    TSH with reflex to Free T4 if abnormal            Subjective     Patient ID: Bautista Mendoza is a 84 y.o. male who presents for Results.    History of present illness  82 years old male with multiple chronic medical problems as in the active problem list and also assessed today came for a follow-up visit and also has had a blood test done.    He had seen the [facility] vascular surgery and had a CT angiogram done and was told there is no further intervention needed for his carotid arteries.    He is having right hip discomfort for which she is seeing the orthopedic surgeon.  He was given steroid injection for possible bursitis which did not improve his symptoms and he was given oral dexamethasone.  He says that has decreased his pain by at least 30 to 35%.  He is going to be following with the orthopedic surgeon in about 2 to 3 days from today.    He wants to keep himself busy.  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  But he admits that his ability to do his usual work is almost impossible.    He understands importance of fall prevention.    He drinks beer when he is working in his garage.  He says he drinks about 3-4 times a week and 2 drinks a day.    He has some symptoms of claudication but he admits that he is not walking or doing activities too much to cause the  pain.    Family History   Problem Relation Name Age of Onset    Cancer Mother        Social History     Socioeconomic History    Marital status:      Spouse name: Not on file    Number of children: Not on file    Years of education: Not on file    Highest education level: Not on file   Occupational History    Not on file   Tobacco Use    Smoking status: Former     Current packs/day: 0.00     Average packs/day: 0.3 packs/day for 45.0 years (11.3 ttl pk-yrs)     Types: Cigarettes     Start date:      Quit date:      Years since quittin.8    Smokeless tobacco: Never   Vaping Use    Vaping status: Never Used   Substance and Sexual Activity    Alcohol use: Yes     Comment: occassion    Drug use: Never    Sexual activity: Not on file   Other Topics Concern    Not on file   Social History Narrative    Not on file     Social Drivers of Health     Financial Resource Strain: Low Risk  (2023)    Received from [facility]    Overall Financial Resource Strain (CARDIA)     Difficulty of Paying Living Expenses: Not hard at all   Food Insecurity: No Food Insecurity (2023)    Received from [facility]    Hunger Vital Sign     Worried About Running Out of Food in the Last Year: Never true     Ran Out of Food in the Last Year: Never true   Transportation Needs: No Transportation Needs (2023)    Received from [facility]    PRAPARE - Transportation     Lack of Transportation (Medical): No     Lack of Transportation (Non-Medical): No   Physical Activity: Not on file   Stress: Not on file   Social Connections: Not on file   Intimate Partner Violence: Not on file   Housing Stability: Unknown (2023)    Received from [facility]    Housing Stability Vital Sign     Unable to Pay for Housing in the Last Year: No     Number of Places Lived in the Last Year: Not on file     Unstable Housing in the Last Year: No       Hydromorphone   Current Outpatient Medications   Medication Sig Dispense Refill    acetaminophen (Tylenol) 500 mg tablet Take 2 tablets (1,000 mg) by mouth every 6 hours if needed for mild pain (1 - 3).      amiodarone (Pacerone) 200 mg tablet Take 1 tablet (200 mg) by mouth 2 times a day.      aspirin 81 mg EC tablet Take 1 tablet (81 mg) by mouth once daily.      atorvastatin (Lipitor) 40 mg tablet TAKE 1 TABLET BY MOUTH AT  BEDTIME 90 tablet 3    cholecalciferol (Vitamin D-3) 50 MCG (2000 UT) tablet Take 1 tablet (2,000 Units) by mouth once daily.      docusate sodium (Colace) 100 mg capsule TAKE 1 CAPSULE BY MOUTH TWICE DAILY 180 capsule 1    FeroSuL tablet TAKE 1 TABLET BY MOUTH DAILY AS DIRECTED 90 tablet 1    fluticasone propion-salmeteroL (Advair Diskus) 250-50 mcg/dose diskus inhaler INHALE 1 PUFF BY MOUTH EVERY 12 HOURS 180 each 1    lisinopril 10 mg tablet TAKE 1 TABLET(10 MG) BY MOUTH EVERY DAY 90 tablet 3    metoprolol tartrate (Lopressor) 25 mg tablet Take 0.5 tablets (12.5 mg) by mouth once daily in the morning. 360 tablet 3    Xarelto 20 mg tablet TAKE 1 TABLET BY MOUTH ONCE  DAILY IN THE EVENING 90 tablet 1    arnica 20 % tincture Apply 2 mL topically 2 times a day. 120 mL 1    cyclobenzaprine (Flexeril) 5 mg tablet TAKE 1 TABLET(5 MG) BY MOUTH DAILY AT BEDTIME (Patient not taking: Reported on 11/18/2024) 90 tablet 1     No current facility-administered medications for this visit.        Objective     Vitals:    11/18/24 0904   BP: 116/85   Pulse: 68        Physical Exam  Normal-built, well-nourished with no apparent distress. Alert oriented  Skin: Normal turgor. No rash.  Has slightly decreased hearing.  Head: Normocephalic, atraumatic.  Eyes: Pupils are equal, round,.  No pallor of conjunctivae.  Mucous membranes are moist  Neck: Supple. No JVD.  Left carotid postoperative scar has no evidence of infection. No thyromegaly. No cervical lymphadenopathy.    Left neck scar healed. Right neck scar  healing.   No clubbing, has significant peripheral osteoarthritis noted,   Chest: Vesicular breathing Bilaterally moderate air entry and rare wheezing.  No crackles.  Heart: Irregular rate and rhythm controlled. S1, S2 positive.   Abdomen: Soft and nontender. Bowel sounds are positive. No organomegaly.  Extremities: Right leg no pedal pitting edema.    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  patient is able to bear weight on the left leg with amputation and the prosthesis.  Right leg pedal pulses are diminished but no evidence of acute ischemia  Neuro Exam: No focal signs. Able to walk with the prostheses independently.            Problem List Items Addressed This Visit       Atrial fibrillation (Multi)     Rate is controlled and patient is on anticoagulation.  Follows with the cardiologist.         Relevant Orders    Comprehensive Metabolic Panel    Chronic obstructive pulmonary disease (Multi)     Reformed smoker and no evidence of exacerbation.         Elevated hemoglobin A1c     Discussed the diet control.  He is on steroids intermittently from orthopedic for his hip pain.  He we will follow-up the hemoglobin A1c.         Relevant Orders    Hemoglobin A1C    Femoral artery aneurysm, right (CMS-HCC)     Follows vascular surgery with [facility].         Ischemic dilated cardiomyopathy (Multi)     Patient follows with the [facility] and he has had open heart surgery in the past.  Relatively stable at this time with no angina but his activities are limited.  He continues the Xarelto and other medications including statin.  He is a reformed smoker.         Relevant Orders    Comprehensive Metabolic Panel    Lipid Panel    HTN (hypertension) - Primary     Blood pressure well-controlled.         Peripheral arterial disease (CMS-HCC)     Patient follows with the [facility] and he has had revascularization procedures in the past.  Relatively stable at this time with no resting leg pain.   He continues the Xarelto and other medications including statin.  He is a reformed smoker.         Right hip pain     As noted in the history and physical.  He is following with the orthopedic.  He may need an MRI.  He was treated with steroids and he has a follow-up appointment within next 2 to 3 days with the orthopedic.         Vitamin D deficiency     On oral vitamin D and his level is acceptable.         Relevant Orders    CBC and Auto Differential    Vitamin D 25-Hydroxy,Total (for eval of Vitamin D levels)    Social alcohol use     Discussed the risk of alcohol and also importance of minimizing it.         Leukocytosis     Probably due to his steroids which was used by the orthopedic.  No evidence of any infection and will be followed up with the next blood draw.  If there is any change he understands to call me.          Other Visit Diagnoses       Ischemic cardiomyopathy        Relevant Orders    TSH with reflex to Free T4 if abnormal    Disorder of muscle, unspecified        Relevant Orders    TSH with reflex to Free T4 if abnormal             Orders Placed This Encounter   Procedures    Flu vaccine, trivalent, preservative free, HIGH-DOSE, age 65y+ (Fluzone)    CBC and Auto Differential     Standing Status:   Future     Standing Expiration Date:   5/18/2025     Order Specific Question:   Release result to MyChart     Answer:   Immediate    Comprehensive Metabolic Panel     Standing Status:   Future     Standing Expiration Date:   11/18/2025     Order Specific Question:   Release result to MyChart     Answer:   Immediate    Lipid Panel     fasting     Standing Status:   Future     Standing Expiration Date:   5/18/2025     Order Specific Question:   Release result to MyChart     Answer:   Immediate [1]    TSH with reflex to Free T4 if abnormal     Standing Status:   Future     Standing Expiration Date:   5/18/2025     Order Specific Question:   Release result to MyChart     Answer:   Immediate    Hemoglobin  A1C     Standing Status:   Future     Standing Expiration Date:   5/18/2025     Order Specific Question:   Release result to MyChart     Answer:   Immediate    Vitamin D 25-Hydroxy,Total (for eval of Vitamin D levels)     Standing Status:   Future     Standing Expiration Date:   11/18/2025     Order Specific Question:   Release result to MyChart     Answer:   Immediate        Lab Results   Component Value Date    WBC 18.4 (H) 11/12/2024    HGB 13.5 11/12/2024    HCT 40.1 (L) 11/12/2024     11/12/2024    CHOL 130 11/12/2024    TRIG 82 11/12/2024    HDL 66.4 11/12/2024    ALT 38 11/12/2024    AST 18 11/12/2024     11/12/2024    K 4.3 11/12/2024    CL 98 11/12/2024    CREATININE 0.94 11/12/2024    BUN 30 (H) 11/12/2024    CO2 31 11/12/2024    TSH 0.45 11/12/2024    PSA 0.60 05/08/2019    HGBA1C 5.9 (H) 11/12/2024     Lab Results   Component Value Date    CHOL 130 11/12/2024    LDLCALC 47 11/12/2024    CHHDL 2.0 11/12/2024       No results found.              Patient was identified as a fall risk. Risk prevention instructions provided.

## 2024-11-18 NOTE — ASSESSMENT & PLAN NOTE
Probably due to his steroids which was used by the orthopedic.  No evidence of any infection and will be followed up with the next blood draw.  If there is any change he understands to call me.

## 2024-11-18 NOTE — ASSESSMENT & PLAN NOTE
Patient follows with the [facility] and he has had open heart surgery in the past.  Relatively stable at this time with no angina but his activities are limited.  He continues the Xarelto and other medications including statin.  He is a reformed smoker.

## 2024-11-18 NOTE — ASSESSMENT & PLAN NOTE
Patient follows with the [facility] and he has had revascularization procedures in the past.  Relatively stable at this time with no resting leg pain.  He continues the Xarelto and other medications including statin.  He is a reformed smoker.

## 2024-11-18 NOTE — PATIENT INSTRUCTIONS

## 2024-11-19 ENCOUNTER — TELEPHONE (OUTPATIENT)
Dept: PRIMARY CARE | Facility: CLINIC | Age: 84
End: 2024-11-19
Payer: MEDICARE

## 2024-11-21 ENCOUNTER — APPOINTMENT (OUTPATIENT)
Dept: PRIMARY CARE | Facility: CLINIC | Age: 84
End: 2024-11-21
Payer: MEDICARE

## 2024-11-25 DIAGNOSIS — M79.604 CHRONIC PAIN OF RIGHT LOWER EXTREMITY: Primary | ICD-10-CM

## 2024-11-25 DIAGNOSIS — I73.9 CLAUDICATION OF RIGHT LOWER EXTREMITY (CMS-HCC): ICD-10-CM

## 2024-11-25 DIAGNOSIS — G89.29 CHRONIC PAIN OF RIGHT LOWER EXTREMITY: Primary | ICD-10-CM

## 2024-11-25 DIAGNOSIS — M25.552 LEFT HIP PAIN: ICD-10-CM

## 2024-11-25 RX ORDER — OXYCODONE AND ACETAMINOPHEN 7.5; 325 MG/1; MG/1
1 TABLET ORAL EVERY 6 HOURS PRN
Qty: 20 TABLET | Refills: 0 | Status: SHIPPED | OUTPATIENT
Start: 2024-11-25 | End: 2024-12-02

## 2024-11-25 NOTE — PROGRESS NOTES
Patient has chronic pain in his leg due to his severe peripheral artery disease.  He takes pain medication only as needed basis and requesting pain medication which will be refilled but he needs to be followed up to have consent signed during his next office visit.

## 2024-12-18 DIAGNOSIS — D64.9 ANEMIA, UNSPECIFIED TYPE: ICD-10-CM

## 2024-12-21 RX ORDER — FERROUS SULFATE 325(65) MG
1 TABLET ORAL DAILY
Qty: 90 TABLET | Refills: 1 | Status: SHIPPED | OUTPATIENT
Start: 2024-12-21

## 2025-01-07 ENCOUNTER — HOSPITAL ENCOUNTER (OUTPATIENT)
Dept: RADIOLOGY | Facility: HOSPITAL | Age: 85
Discharge: HOME | End: 2025-01-07
Payer: MEDICARE

## 2025-01-07 ENCOUNTER — LAB (OUTPATIENT)
Dept: LAB | Facility: LAB | Age: 85
End: 2025-01-07
Payer: MEDICARE

## 2025-01-07 ENCOUNTER — OFFICE VISIT (OUTPATIENT)
Dept: PRIMARY CARE | Facility: CLINIC | Age: 85
End: 2025-01-07
Payer: MEDICARE

## 2025-01-07 VITALS
WEIGHT: 191 LBS | BODY MASS INDEX: 28.29 KG/M2 | HEART RATE: 83 BPM | HEIGHT: 69 IN | SYSTOLIC BLOOD PRESSURE: 135 MMHG | DIASTOLIC BLOOD PRESSURE: 85 MMHG

## 2025-01-07 DIAGNOSIS — I25.5 ISCHEMIC DILATED CARDIOMYOPATHY (MULTI): ICD-10-CM

## 2025-01-07 DIAGNOSIS — I42.0 ISCHEMIC DILATED CARDIOMYOPATHY (MULTI): ICD-10-CM

## 2025-01-07 DIAGNOSIS — Z89.519: ICD-10-CM

## 2025-01-07 DIAGNOSIS — Z12.5 PROSTATE CANCER SCREENING: ICD-10-CM

## 2025-01-07 DIAGNOSIS — R31.0 GROSS HEMATURIA: ICD-10-CM

## 2025-01-07 DIAGNOSIS — I48.0 PAROXYSMAL ATRIAL FIBRILLATION (MULTI): ICD-10-CM

## 2025-01-07 DIAGNOSIS — E55.9 VITAMIN D DEFICIENCY: ICD-10-CM

## 2025-01-07 DIAGNOSIS — I10 ESSENTIAL HYPERTENSION, BENIGN: ICD-10-CM

## 2025-01-07 DIAGNOSIS — I70.223 ATHEROSCLEROSIS OF NATIVE ARTERIES OF EXTREMITIES WITH REST PAIN, BILATERAL LEGS (MULTI): ICD-10-CM

## 2025-01-07 DIAGNOSIS — J43.1 PANLOBULAR EMPHYSEMA (MULTI): ICD-10-CM

## 2025-01-07 DIAGNOSIS — Z79.01 ANTICOAGULATED: ICD-10-CM

## 2025-01-07 DIAGNOSIS — M62.9 DISORDER OF MUSCLE, UNSPECIFIED: ICD-10-CM

## 2025-01-07 DIAGNOSIS — I25.5 ISCHEMIC CARDIOMYOPATHY: ICD-10-CM

## 2025-01-07 DIAGNOSIS — R73.09 ELEVATED HEMOGLOBIN A1C: ICD-10-CM

## 2025-01-07 DIAGNOSIS — R31.0 GROSS HEMATURIA: Primary | ICD-10-CM

## 2025-01-07 LAB
25(OH)D3 SERPL-MCNC: 35 NG/ML (ref 30–100)
ALBUMIN SERPL BCP-MCNC: 4.2 G/DL (ref 3.4–5)
ALP SERPL-CCNC: 67 U/L (ref 33–136)
ALT SERPL W P-5'-P-CCNC: 16 U/L (ref 10–52)
ANION GAP SERPL CALC-SCNC: 14 MMOL/L (ref 10–20)
APTT PPP: 45 SECONDS (ref 27–38)
AST SERPL W P-5'-P-CCNC: 18 U/L (ref 9–39)
BASOPHILS # BLD AUTO: 0.08 X10*3/UL (ref 0–0.1)
BASOPHILS NFR BLD AUTO: 0.6 %
BILIRUB SERPL-MCNC: 0.7 MG/DL (ref 0–1.2)
BILIRUBIN, POC: ABNORMAL
BLOOD URINE, POC: POSITIVE
BUN SERPL-MCNC: 18 MG/DL (ref 6–23)
CALCIUM SERPL-MCNC: 8.9 MG/DL (ref 8.6–10.3)
CHLORIDE SERPL-SCNC: 98 MMOL/L (ref 98–107)
CHOLEST SERPL-MCNC: 103 MG/DL (ref 0–199)
CHOLESTEROL/HDL RATIO: 2.3
CO2 SERPL-SCNC: 29 MMOL/L (ref 21–32)
CREAT SERPL-MCNC: 0.91 MG/DL (ref 0.5–1.3)
EGFRCR SERPLBLD CKD-EPI 2021: 84 ML/MIN/1.73M*2
EOSINOPHIL # BLD AUTO: 0.29 X10*3/UL (ref 0–0.4)
EOSINOPHIL NFR BLD AUTO: 2.3 %
ERYTHROCYTE [DISTWIDTH] IN BLOOD BY AUTOMATED COUNT: 14.3 % (ref 11.5–14.5)
EST. AVERAGE GLUCOSE BLD GHB EST-MCNC: 103 MG/DL
GLUCOSE SERPL-MCNC: 104 MG/DL (ref 74–99)
GLUCOSE URINE, POC: NEGATIVE
HBA1C MFR BLD: 5.2 %
HCT VFR BLD AUTO: 41.4 % (ref 41–52)
HDLC SERPL-MCNC: 45.7 MG/DL
HGB BLD-MCNC: 13 G/DL (ref 13.5–17.5)
IMM GRANULOCYTES # BLD AUTO: 0.08 X10*3/UL (ref 0–0.5)
IMM GRANULOCYTES NFR BLD AUTO: 0.6 % (ref 0–0.9)
INR PPP: 2.1 (ref 0.9–1.1)
KETONES, POC: POSITIVE
LDLC SERPL CALC-MCNC: 41 MG/DL
LEUKOCYTE EST, POC: NEGATIVE
LYMPHOCYTES # BLD AUTO: 3.97 X10*3/UL (ref 0.8–3)
LYMPHOCYTES NFR BLD AUTO: 31 %
MCH RBC QN AUTO: 30.6 PG (ref 26–34)
MCHC RBC AUTO-ENTMCNC: 31.4 G/DL (ref 32–36)
MCV RBC AUTO: 97 FL (ref 80–100)
MONOCYTES # BLD AUTO: 1.26 X10*3/UL (ref 0.05–0.8)
MONOCYTES NFR BLD AUTO: 9.8 %
NEUTROPHILS # BLD AUTO: 7.14 X10*3/UL (ref 1.6–5.5)
NEUTROPHILS NFR BLD AUTO: 55.7 %
NITRITE, POC: NEGATIVE
NON HDL CHOLESTEROL: 57 MG/DL (ref 0–149)
NRBC BLD-RTO: 0 /100 WBCS (ref 0–0)
PH, POC: 5.5
PLATELET # BLD AUTO: 291 X10*3/UL (ref 150–450)
POTASSIUM SERPL-SCNC: 4.1 MMOL/L (ref 3.5–5.3)
PROT SERPL-MCNC: 6.9 G/DL (ref 6.4–8.2)
PROTHROMBIN TIME: 24 SECONDS (ref 9.8–12.8)
PSA SERPL-MCNC: 1.36 NG/ML
RBC # BLD AUTO: 4.25 X10*6/UL (ref 4.5–5.9)
SODIUM SERPL-SCNC: 137 MMOL/L (ref 136–145)
SPECIFIC GRAVITY, POC: 1.03
TRIGL SERPL-MCNC: 84 MG/DL (ref 0–149)
TSH SERPL-ACNC: 2.77 MIU/L (ref 0.44–3.98)
URINE PROTEIN, POC: ABNORMAL
UROBILINOGEN, POC: 1
VLDL: 17 MG/DL (ref 0–40)
WBC # BLD AUTO: 12.8 X10*3/UL (ref 4.4–11.3)

## 2025-01-07 PROCEDURE — 81002 URINALYSIS NONAUTO W/O SCOPE: CPT | Performed by: INTERNAL MEDICINE

## 2025-01-07 PROCEDURE — 87086 URINE CULTURE/COLONY COUNT: CPT

## 2025-01-07 PROCEDURE — 3079F DIAST BP 80-89 MM HG: CPT | Performed by: INTERNAL MEDICINE

## 2025-01-07 PROCEDURE — G2211 COMPLEX E/M VISIT ADD ON: HCPCS | Performed by: INTERNAL MEDICINE

## 2025-01-07 PROCEDURE — G0103 PSA SCREENING: HCPCS

## 2025-01-07 PROCEDURE — 82306 VITAMIN D 25 HYDROXY: CPT

## 2025-01-07 PROCEDURE — 3075F SYST BP GE 130 - 139MM HG: CPT | Performed by: INTERNAL MEDICINE

## 2025-01-07 PROCEDURE — 83036 HEMOGLOBIN GLYCOSYLATED A1C: CPT

## 2025-01-07 PROCEDURE — 99214 OFFICE O/P EST MOD 30 MIN: CPT | Performed by: INTERNAL MEDICINE

## 2025-01-07 PROCEDURE — 1160F RVW MEDS BY RX/DR IN RCRD: CPT | Performed by: INTERNAL MEDICINE

## 2025-01-07 PROCEDURE — 1036F TOBACCO NON-USER: CPT | Performed by: INTERNAL MEDICINE

## 2025-01-07 PROCEDURE — 74019 RADEX ABDOMEN 2 VIEWS: CPT

## 2025-01-07 PROCEDURE — 1159F MED LIST DOCD IN RCRD: CPT | Performed by: INTERNAL MEDICINE

## 2025-01-07 PROCEDURE — 1123F ACP DISCUSS/DSCN MKR DOCD: CPT | Performed by: INTERNAL MEDICINE

## 2025-01-07 ASSESSMENT — LIFESTYLE VARIABLES
HOW OFTEN DO YOU HAVE SIX OR MORE DRINKS ON ONE OCCASION: NEVER
HOW OFTEN DO YOU HAVE A DRINK CONTAINING ALCOHOL: MONTHLY OR LESS
HOW MANY STANDARD DRINKS CONTAINING ALCOHOL DO YOU HAVE ON A TYPICAL DAY: 1 OR 2
AUDIT TOTAL SCORE: 1
HAS A RELATIVE, FRIEND, DOCTOR, OR ANOTHER HEALTH PROFESSIONAL EXPRESSED CONCERN ABOUT YOUR DRINKING OR SUGGESTED YOU CUT DOWN: NO
HAVE YOU OR SOMEONE ELSE BEEN INJURED AS A RESULT OF YOUR DRINKING: NO
AUDIT-C TOTAL SCORE: 1
SKIP TO QUESTIONS 9-10: 1

## 2025-01-07 NOTE — PROGRESS NOTES
Assessment/Plan   82 years old male who is functional and active except he has limitations because of his peripheral artery disease and his chronic cardiac problems came for an acute visit with hematuria.  There is no evidence of infection.  Patient has had no history of nephro or urolithiasis.  I discussed the differential diagnosis in detail with the patient.  At this time we will hold off treating him with any antibiotics or pain medication since patient has no pain.  I discussed with him about the anticoagulation and patient is on full dose of Xarelto 20 mg a day.  Given his multiple comorbid conditions including atrial fibrillation ischemic cardiomyopathy and significant peripheral arterial disease which needed even amputation of the leg I would hold off stopping the anticoagulation and the aspirin.  Patient was advised to see the cardiology service with the [facility] as soon as possible to review and discuss this anticoagulation.    I have ordered blood test to rule out any worsening anemia and also we will review the PT/INR and PTT.    Patient had a x-ray KUB done which shows questionable urolithiasis but there is no symptoms of colicky pain to indicate urolithiasis.  Patient will have a urology visit tomorrow to have evaluation by urologist.    I have ordered a PSA to make sure there is no evidence of prostatitis but this will be further reviewed in context of patient's age and the comorbid conditions with the urology consult.    I will follow the patient in 2 weeks but he understands to follow-up with the urology as scheduled and also see the cardiologist to have a direction regarding the anticoagulation.          Problem List Items Addressed This Visit       Atrial fibrillation (Multi)    Chronic obstructive pulmonary disease (Multi)    Essential hypertension, benign    Relevant Orders    Comprehensive Metabolic Panel    Ischemic dilated cardiomyopathy (Multi)    Atherosclerosis of native arteries  of extremities with rest pain, bilateral legs (Multi)    S/P unilateral BKA (below knee amputation) (Multi)    Gross hematuria - Primary    Relevant Orders    POCT urinalysis dipstick manually resulted (Completed)    US bladder    US renal complete    XR abdomen 2 views supine and erect or decub (Completed)    Referral to Urology    CBC and Auto Differential    Comprehensive Metabolic Panel    aPTT (Completed)    Protime-INR (Completed)    Prostate Specific Antigen, Screen    Urine Culture    Anticoagulated    Relevant Orders    aPTT (Completed)    Protime-INR (Completed)     Other Visit Diagnoses       Prostate cancer screening        Relevant Orders    Prostate Specific Antigen, Screen            Subjective     Patient ID: Bautista Mendoza is a 84 y.o. male who presents for Blood in Urine (X 1 month).    History of present illness  84 years old male who has multiple chronic medical problems which includes as coronary artery disease with cardiomyopathy, atrial fibrillation, significant peripheral artery disease history of emphysema came for an acute visit because of having discolored urine for about 6 weeks.  Patient is on chronic anticoagulation because of his cardiac problems including atrial fibrillation and patient says that he noticed some discolored urine about 6 weeks ago and then because of the holidays and all the other social events he has postponed the follow-up visit.  When he saw me about 2 months ago he says he has had no blood in the urine or discolored urine.    Patient denies any back pain.  He denies any burning urination.  He has had no fever no chills.  He denies any trauma.  He has had no history of kidney stones or blood in the urine in the past.    He still have limited ambulation because of the leg pains.  But at rest he has no leg pain.  The prosthetic leg is also doing well and he has had no ulcers.    He is a reformed smoker did not smoke for more than 10 years.  No history of falls.  No  chest pain or short of breath no nausea vomiting no palpitations.        Family History   Problem Relation Name Age of Onset    Cancer Mother        Social History     Socioeconomic History    Marital status:      Spouse name: Not on file    Number of children: Not on file    Years of education: Not on file    Highest education level: Not on file   Occupational History    Not on file   Tobacco Use    Smoking status: Former     Current packs/day: 0.00     Average packs/day: 0.3 packs/day for 45.0 years (11.3 ttl pk-yrs)     Types: Cigarettes     Start date:      Quit date:      Years since quittin.0    Smokeless tobacco: Never   Vaping Use    Vaping status: Never Used   Substance and Sexual Activity    Alcohol use: Yes     Comment: occassion    Drug use: Never    Sexual activity: Not on file   Other Topics Concern    Not on file   Social History Narrative    Not on file     Social Drivers of Health     Financial Resource Strain: Low Risk  (2023)    Received from [facility]    Overall Financial Resource Strain (CARDIA)     Difficulty of Paying Living Expenses: Not hard at all   Food Insecurity: No Food Insecurity (2023)    Received from [facility]    Hunger Vital Sign     Worried About Running Out of Food in the Last Year: Never true     Ran Out of Food in the Last Year: Never true   Transportation Needs: No Transportation Needs (2023)    Received from [facility]    PRAPARE - Transportation     Lack of Transportation (Medical): No     Lack of Transportation (Non-Medical): No   Physical Activity: Not on file   Stress: Not on file   Social Connections: Not on file   Intimate Partner Violence: Not on file   Housing Stability: Unknown (2023)    Received from [facility]    Housing Stability Vital Sign     Unable to Pay for Housing in the Last Year: No     Number of Places Lived in the Last  Year: Not on file     Unstable Housing in the Last Year: No      Hydromorphone   Current Outpatient Medications   Medication Sig Dispense Refill    acetaminophen (Tylenol) 500 mg tablet Take 2 tablets (1,000 mg) by mouth every 6 hours if needed for mild pain (1 - 3).      amiodarone (Pacerone) 200 mg tablet Take 1 tablet (200 mg) by mouth once daily.      arnica 20 % tincture Apply 2 mL topically 2 times a day. 120 mL 1    aspirin 81 mg EC tablet Take 1 tablet (81 mg) by mouth once daily.      atorvastatin (Lipitor) 40 mg tablet TAKE 1 TABLET BY MOUTH AT  BEDTIME 90 tablet 3    cholecalciferol (Vitamin D-3) 50 MCG (2000 UT) tablet Take 1 tablet (2,000 Units) by mouth once daily.      docusate sodium (Colace) 100 mg capsule TAKE 1 CAPSULE BY MOUTH TWICE DAILY 180 capsule 1    FeroSuL tablet TAKE 1 TABLET BY MOUTH DAILY AS DIRECTED 90 tablet 1    fluticasone propion-salmeteroL (Advair Diskus) 250-50 mcg/dose diskus inhaler INHALE 1 PUFF BY MOUTH EVERY 12 HOURS 180 each 1    lisinopril 10 mg tablet TAKE 1 TABLET(10 MG) BY MOUTH EVERY DAY 90 tablet 3    metoprolol tartrate (Lopressor) 25 mg tablet Take 0.5 tablets (12.5 mg) by mouth once daily in the morning. 360 tablet 3    Xarelto 20 mg tablet TAKE 1 TABLET BY MOUTH ONCE  DAILY IN THE EVENING 90 tablet 1    cyclobenzaprine (Flexeril) 5 mg tablet TAKE 1 TABLET(5 MG) BY MOUTH DAILY AT BEDTIME (Patient not taking: Reported on 1/7/2025) 90 tablet 1     No current facility-administered medications for this visit.        Objective     Vitals:    01/07/25 0910   BP: 135/85   Pulse: 83        Physical Exam  Normal-built, well-nourished with no apparent distress. Alert oriented  Skin: Normal turgor. No rash.  Has slightly decreased hearing.  Head: Normocephalic, atraumatic.  Eyes: Pupils are equal, round,.  No pallor of conjunctivae.  Mucous membranes are moist  Neck: Supple. No JVD.  Bilateral neck surgical scars well-healed.  No clubbing, has significant peripheral  osteoarthritis noted,   Chest: Vesicular breathing Bilaterally moderate air entry and rare wheezing.  No crackles.  Heart: Irregular rate and rhythm controlled. S1, S2 positive.   Abdomen: Soft and nontender. Bowel sounds are positive. No organomegaly.  Extremities: Right leg no pedal pitting edema.    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  patient is able to bear weight on the left leg with amputation and the prosthesis.  Right leg pedal pulses are diminished but no evidence of acute ischemia  Neuro Exam: No focal signs. Able to walk with the prostheses independently.        Problem List Items Addressed This Visit       Atrial fibrillation (Multi)    Chronic obstructive pulmonary disease (Multi)    Essential hypertension, benign    Relevant Orders    Comprehensive Metabolic Panel    Ischemic dilated cardiomyopathy (Multi)    Atherosclerosis of native arteries of extremities with rest pain, bilateral legs (Multi)    S/P unilateral BKA (below knee amputation) (Multi)    Gross hematuria - Primary    Relevant Orders    POCT urinalysis dipstick manually resulted (Completed)    US bladder    US renal complete    XR abdomen 2 views supine and erect or decub (Completed)    Referral to Urology    CBC and Auto Differential    Comprehensive Metabolic Panel    aPTT (Completed)    Protime-INR (Completed)    Prostate Specific Antigen, Screen    Urine Culture    Anticoagulated    Relevant Orders    aPTT (Completed)    Protime-INR (Completed)     Other Visit Diagnoses       Prostate cancer screening        Relevant Orders    Prostate Specific Antigen, Screen             Orders Placed This Encounter   Procedures    Urine Culture     Order Specific Question:   Release result to [facility]     Answer:   Immediate [1]    US bladder     Standing Status:   Future     Standing Expiration Date:   1/7/2026     Order Specific Question:   Reason for exam:     Answer:   hematuria     Order Specific Question:   Radiologist to  Determine Optimal Study     Answer:   Yes     Order Specific Question:   Release result to MyChart     Answer:   Immediate [1]     Order Specific Question:   Is this exam part of a Research Study? If Yes, link this order to the research study     Answer:   No    US renal complete     Standing Status:   Future     Standing Expiration Date:   1/7/2026     Order Specific Question:   Reason for exam:     Answer:   hematuria     Order Specific Question:   Radiologist to Determine Optimal Study     Answer:   Yes     Order Specific Question:   Release result to MyChart     Answer:   Immediate [1]     Order Specific Question:   Is this exam part of a Research Study? If Yes, link this order to the research study     Answer:   No    XR abdomen 2 views supine and erect or decub     Standing Status:   Future     Number of Occurrences:   1     Standing Expiration Date:   1/7/2026     Order Specific Question:   Reason for exam:     Answer:   hematuria     Order Specific Question:   Radiologist to Determine Optimal Study     Answer:   Yes     Order Specific Question:   Release result to MyChart     Answer:   Immediate [1]     Order Specific Question:   Is this exam part of a Research Study? If Yes, link this order to the research study     Answer:   No    CBC and Auto Differential     Standing Status:   Future     Number of Occurrences:   1     Standing Expiration Date:   5/7/2025     Order Specific Question:   Release result to MyChart     Answer:   Immediate    Comprehensive Metabolic Panel     Standing Status:   Future     Number of Occurrences:   1     Standing Expiration Date:   1/7/2026     Order Specific Question:   Release result to MyChart     Answer:   Immediate    aPTT     Standing Status:   Future     Number of Occurrences:   1     Standing Expiration Date:   1/7/2026     Order Specific Question:   Release result to MyChart     Answer:   Immediate [1]    Protime-INR     Standing Status:   Future     Number of  Occurrences:   1     Standing Expiration Date:   1/7/2026     Order Specific Question:   [facility]     Answer:   Immediate [1]    Prostate Specific Antigen, Screen     Standing Status:   Future     Number of Occurrences:   1     Standing Expiration Date:   1/7/2026     Order Specific Question:   Release result to [facility]     Answer:   Immediate [1]    Referral to Urology     Standing Status:   Future     Standing Expiration Date:   1/7/2026     Referral Priority:   Urgent     Referral Type:   Consultation     Referral Reason:   Specialty Services Required     Requested Specialty:   Urology     Number of Visits Requested:   1    POCT urinalysis dipstick manually resulted     Order Specific Question:   [facility]     Answer:   Immediate [1]        Lab Results   Component Value Date    WBC 12.8 (H) 01/07/2025    HGB 13.0 (L) 01/07/2025    HCT 41.4 01/07/2025     01/07/2025    CHOL 103 01/07/2025    TRIG 84 01/07/2025    HDL 45.7 01/07/2025    ALT 16 01/07/2025    AST 18 01/07/2025     01/07/2025    K 4.1 01/07/2025    CL 98 01/07/2025    CREATININE 0.91 01/07/2025    BUN 18 01/07/2025    CO2 29 01/07/2025    TSH 2.77 01/07/2025    PSA 0.60 05/08/2019    INR 2.1 (H) 01/07/2025    HGBA1C 5.9 (H) 11/12/2024     Lab Results   Component Value Date    CHOL 103 01/07/2025    LDLCALC 41 01/07/2025    CHHDL 2.3 01/07/2025     Results of x-ray KUB from today  IMPRESSION:  Focal opacification in the left paramidline mid abdomen could reflect  fecal content or ureteral calculus. Further evaluation with  noncontrast CT abdomen pelvis is recommended.        Patient was identified as a fall risk. Risk prevention instructions provided.

## 2025-01-08 ENCOUNTER — OFFICE VISIT (OUTPATIENT)
Dept: UROLOGY | Facility: CLINIC | Age: 85
End: 2025-01-08
Payer: MEDICARE

## 2025-01-08 ENCOUNTER — TELEPHONE (OUTPATIENT)
Dept: PRIMARY CARE | Facility: CLINIC | Age: 85
End: 2025-01-08

## 2025-01-08 VITALS
BODY MASS INDEX: 28.06 KG/M2 | TEMPERATURE: 97.8 F | WEIGHT: 190 LBS | SYSTOLIC BLOOD PRESSURE: 155 MMHG | HEART RATE: 85 BPM | DIASTOLIC BLOOD PRESSURE: 92 MMHG

## 2025-01-08 DIAGNOSIS — R31.0 GROSS HEMATURIA: ICD-10-CM

## 2025-01-08 PROCEDURE — 99204 OFFICE O/P NEW MOD 45 MIN: CPT | Performed by: STUDENT IN AN ORGANIZED HEALTH CARE EDUCATION/TRAINING PROGRAM

## 2025-01-08 PROCEDURE — 3077F SYST BP >= 140 MM HG: CPT | Performed by: STUDENT IN AN ORGANIZED HEALTH CARE EDUCATION/TRAINING PROGRAM

## 2025-01-08 PROCEDURE — 1160F RVW MEDS BY RX/DR IN RCRD: CPT | Performed by: STUDENT IN AN ORGANIZED HEALTH CARE EDUCATION/TRAINING PROGRAM

## 2025-01-08 PROCEDURE — G2211 COMPLEX E/M VISIT ADD ON: HCPCS | Performed by: STUDENT IN AN ORGANIZED HEALTH CARE EDUCATION/TRAINING PROGRAM

## 2025-01-08 PROCEDURE — 1123F ACP DISCUSS/DSCN MKR DOCD: CPT | Performed by: STUDENT IN AN ORGANIZED HEALTH CARE EDUCATION/TRAINING PROGRAM

## 2025-01-08 PROCEDURE — 1159F MED LIST DOCD IN RCRD: CPT | Performed by: STUDENT IN AN ORGANIZED HEALTH CARE EDUCATION/TRAINING PROGRAM

## 2025-01-08 PROCEDURE — 3080F DIAST BP >= 90 MM HG: CPT | Performed by: STUDENT IN AN ORGANIZED HEALTH CARE EDUCATION/TRAINING PROGRAM

## 2025-01-08 PROCEDURE — 1036F TOBACCO NON-USER: CPT | Performed by: STUDENT IN AN ORGANIZED HEALTH CARE EDUCATION/TRAINING PROGRAM

## 2025-01-08 NOTE — PROGRESS NOTES
Scribed for Dr. Ochoa Brock by Abran Sanchez. I, Dr. Ochoa Brock have personally reviewed and agreed with the information entered by the Virtual Scribe. 01/08/25.    ASSESSMENT:  Problem List Items Addressed This Visit       Gross hematuria    Relevant Orders    POCT UA Automated manually resulted    Follow Up In Urology    CT urography w 3D volume rendered imaging      PLAN:  #Gross hematuria  To address the patient’s hematuria. I recommended the patient follow-up for hematuria to include cystoscopy, CT urogram, and urine cytology. Patient is aware of the risks associated with intravenous contrast. There is no history of renal dysfunction. Risks of cystoscopy were discussed including risk of hematuria, UTI and discomfort. Patient acknowledged they understood and desires to proceed.    RTC in 3-4 weeks for cystoscopy and results.     All questions were answered to the patient’s satisfaction.  Patient agrees with the plan and wishes to proceed.  Continue follow-up for ongoing care of his chronic medical conditions.       History of Present Illness (HPI):  Bautista presents as a new patient for an evaluation.  The patient’s EMR has been reviewed.  Lives in [place], [place].    Saw his PCP (01/07/24) for painless gross hematuria.   UA negative, no prior hx of stones.   Continues anticoagulation (Xarelto 20 mg)  KUB (01/07/25) showed a potential stone.   Referred to me for continued care.     TODAY: (01/08/25)  Reports he has been doing well overall.   Acknowledges episodes of painless gross hematuria.   Denies hx of UTI's, or gross hematuria until recently.  He is a former smoker (30 pack year hx)  No urinary issues.   IPSS 7.     KUB (01/07/25) personally reviewed.   Focal opacification in the left paramidline mid abdomen could reflect fecal content or ureteral calculus.    PMH: PAD, AFIB, ischemic cardiomyopathy  PSH: Amputation of lower extremity, CA angioplasty, carotid endarterectomy, hip replacement  (partial), hernia repair, arterial angioplasty.  FH: Denies FH of  malignancy.   SH: Former smoker.     Past Medical History:   Diagnosis Date    Acute posthemorrhagic anemia 11/12/2021    Postoperative anemia due to acute blood loss    Atherosclerotic peripheral vascular disease with intermittent claudication (CMS-HCC) 07/23/2019    Formatting of this note might be different from the original. History: 80 yo patient with history of BK amputation in the past. He had right femoral endarterectomy and patch right SFA stent many years ago. He presented in the office with a progressive leg pain and claudication of the right calf short distance. Also, he had some symptoms and ischemic pain in the left stump. The patient had abnormal    Bilateral carotid artery stenosis 04/19/2023    Last Assessment & Plan: Formatting of this note might be different from the original. Assessment: see most recent US above. Asymptomatic. Monitored by cardiology/vascular.    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    Paroxysmal atrial fibrillation (Multi) 11/12/2021    PAF (paroxysmal atrial fibrillation)    Personal history of other diseases of the circulatory system     History of vascular disorder    Personal history of other diseases of the circulatory system 11/11/2021    History of hypertension     Past Surgical History:   Procedure Laterality Date    CAROTID ARTERY ANGIOPLASTY Bilateral     CAROTID ENDARTERECTOMY Right     OTHER SURGICAL HISTORY  05/09/2019    Lower extremity amputation below knee    OTHER SURGICAL HISTORY  05/09/2019    Lower extremity amputation    OTHER SURGICAL HISTORY  05/09/2019    Hip replacement partial    OTHER SURGICAL HISTORY  08/03/2022    Colonoscopy    OTHER SURGICAL HISTORY  11/11/2021    Lower extremity amputation above knee     OTHER SURGICAL HISTORY  2021    Hernia repair    OTHER SURGICAL HISTORY  2021    Arterial angioplasty of lower extremity    OTHER SURGICAL HISTORY  2021    Surgery     Family History   Problem Relation Name Age of Onset    Cancer Mother       Social History     Tobacco Use   Smoking Status Former    Current packs/day: 0.00    Average packs/day: 0.3 packs/day for 45.0 years (11.3 ttl pk-yrs)    Types: Cigarettes    Start date:     Quit date:     Years since quittin.0   Smokeless Tobacco Never     Current Outpatient Medications   Medication Sig Dispense Refill    acetaminophen (Tylenol) 500 mg tablet Take 2 tablets (1,000 mg) by mouth every 6 hours if needed for mild pain (1 - 3).      amiodarone (Pacerone) 200 mg tablet Take 1 tablet (200 mg) by mouth once daily.      arnica 20 % tincture Apply 2 mL topically 2 times a day. 120 mL 1    aspirin 81 mg EC tablet Take 1 tablet (81 mg) by mouth once daily.      atorvastatin (Lipitor) 40 mg tablet TAKE 1 TABLET BY MOUTH AT  BEDTIME 90 tablet 3    cholecalciferol (Vitamin D-3) 50 MCG (2000 UT) tablet Take 1 tablet (2,000 Units) by mouth once daily.      cyclobenzaprine (Flexeril) 5 mg tablet TAKE 1 TABLET(5 MG) BY MOUTH DAILY AT BEDTIME (Patient not taking: Reported on 2025) 90 tablet 1    docusate sodium (Colace) 100 mg capsule TAKE 1 CAPSULE BY MOUTH TWICE DAILY 180 capsule 1    FeroSuL tablet TAKE 1 TABLET BY MOUTH DAILY AS DIRECTED 90 tablet 1    fluticasone propion-salmeteroL (Advair Diskus) 250-50 mcg/dose diskus inhaler INHALE 1 PUFF BY MOUTH EVERY 12 HOURS 180 each 1    lisinopril 10 mg tablet TAKE 1 TABLET(10 MG) BY MOUTH EVERY DAY 90 tablet 3    metoprolol tartrate (Lopressor) 25 mg tablet Take 0.5 tablets (12.5 mg) by mouth once daily in the morning. 360 tablet 3    Xarelto 20 mg tablet TAKE 1 TABLET BY MOUTH ONCE  DAILY IN THE EVENING 90 tablet 1     No current facility-administered medications for this visit.     Allergies    Allergen Reactions    Hydromorphone Nausea/vomiting     Patient gets nauseas     Past medical, surgical, family and social history in the chart was reviewed and is accurate including any additions to what is in this HPI.    REVIEW OF SYSTEMS (ROS):   Constitutional: denies any unintentional weight loss or change in strength.  Integumentary: denies any rashes or pruritus.  Eyes: denies any double vision or eye pain.  Ear/Nose/Mouth/Throat: denies any nosebleeds or gum bleeds.  Cardiovascular: denies any chest pain or syncope.  Respiratory: denies hemoptysis.  Gastrointestinal: denies nausea or vomiting.  Musculoskeletal: denies muscle cramping or weakness.  Neurologic: denies convulsions or seizures.  Hematologic/Lymphatic: denies bleeding tendencies.  Endocrine: denies heat/cold intolerance.  All other systems have been reviewed and are negative unless otherwise noted in the HPI.     OBJECTIVE:  There were no vitals taken for this visit.  PHYSICAL EXAM:  Constitutional: No obvious distress.  Eyes: Non-injected conjunctiva, sclera clear, EOMI.  Ears/Nose/Mouth/Throat: No obvious drainage per ears or nose.  Cardiovascular: Extremities are warm and well perfused. No edema, cyanosis or pallor.  Respiratory: No audible wheezing/stridor; respirations do not appear labored.  Gastrointestinal: Abdomen soft, not distended.  Musculoskeletal: Normal ROM of extremities.  Skin: No obvious rashes or open sores.  Neurologic: Alert and oriented, CN 2-12 grossly intact.  Psychiatric: Answers questions appropriately with normal affect.  Hematologic/Lymphatic/Immunologic: No obvious bruises or sites of spontaneous bleeding.  Genitourinary: No CVA tenderness, bladder not palpable.     LABS & IMAGING:  Basic Labs:  Lab Results   Component Value Date    WBC 12.8 (H) 01/07/2025    HGB 13.0 (L) 01/07/2025    HCT 41.4 01/07/2025     01/07/2025     01/07/2025    K 4.1 01/07/2025    CL 98 01/07/2025    ALT 16 01/07/2025    AST  "18 01/07/2025    CREATININE 0.91 01/07/2025    BUN 18 01/07/2025    CO2 29 01/07/2025    TSH 2.77 01/07/2025    INR 2.1 (H) 01/07/2025     Prostate cancer screening:  Lab Results   Component Value Date    PSA 0.60 05/08/2019     Hormone Labs:  No results found for: \"TESTOSTERONE\"  No results found for: \"LH\"  No results found for: \"FSH\"  No results found for: \"PROLACTIN\"  LIPID panel:  Lab Results   Component Value Date    CHOL 103 01/07/2025     Lab Results   Component Value Date    HDL 45.7 01/07/2025     Lab Results   Component Value Date    CHHDL 2.3 01/07/2025        Lab Results   Component Value Date    LDLCALC 41 01/07/2025     Lab Results   Component Value Date    VLDL 17 01/07/2025     Lab Results   Component Value Date    TRIG 84 01/07/2025     Hemoglobin A1c:  Lab Results   Component Value Date    HGBA1C 5.2 01/07/2025       Scribed for Dr. Ochoa Brock by Abran Sanchez.  By signing my name below, I, Neo Dunawayibmadhu attest that this documentation has been prepared under the direction and in the presence of Ochoa Brock MD. All medical record entries made by the Scribe were at my direction or personally dictated by me. I have reviewed the chart and agree that the record accurately reflects my personal performance of the history, physical exam, discussion and plan.    "

## 2025-01-08 NOTE — RESULT ENCOUNTER NOTE
Results are acceptable, there are some mild abnormalities which are either chronic or expected with the clinical presentation.  Patient should see the urologist and also the cardiologist and the [facility] as we discussed..  We will review the results in detail during office follow-up and please advise patient to keep the follow-up appointment as scheduled.

## 2025-01-08 NOTE — TELEPHONE ENCOUNTER
----- Message from Dewey Higgins sent at 1/8/2025  8:17 AM EST -----  Results are acceptable, there are some mild abnormalities which are either chronic or expected with the clinical presentation.  Patient should see the urologist and also the cardiologist and the [facility] as we discussed..  We will review the results in detail during office follow-up and please advise patient to keep the follow-up appointment as scheduled.

## 2025-01-09 ENCOUNTER — HOSPITAL ENCOUNTER (OUTPATIENT)
Dept: RADIOLOGY | Facility: CLINIC | Age: 85
Discharge: HOME | End: 2025-01-09
Payer: MEDICARE

## 2025-01-09 DIAGNOSIS — R31.0 GROSS HEMATURIA: ICD-10-CM

## 2025-01-09 LAB — BACTERIA UR CULT: NORMAL

## 2025-01-09 PROCEDURE — 76770 US EXAM ABDO BACK WALL COMP: CPT

## 2025-01-09 PROCEDURE — 76770 US EXAM ABDO BACK WALL COMP: CPT | Performed by: RADIOLOGY

## 2025-01-12 NOTE — RESULT ENCOUNTER NOTE
US Results are acceptable and Right kidney has 2 small cysts.  We will review the results in detail during office follow-up and please advise patient to keep the follow-up appointment as scheduled.

## 2025-01-13 ENCOUNTER — TELEPHONE (OUTPATIENT)
Dept: PRIMARY CARE | Facility: CLINIC | Age: 85
End: 2025-01-13
Payer: MEDICARE

## 2025-01-13 NOTE — TELEPHONE ENCOUNTER
----- Message from Dewey Higgins sent at 1/12/2025  2:40 PM EST -----  US Results are acceptable and Right kidney has 2 small cysts.  We will review the results in detail during office follow-up and please advise patient to keep the follow-up appointment as scheduled.

## 2025-01-22 ENCOUNTER — APPOINTMENT (OUTPATIENT)
Dept: PRIMARY CARE | Facility: CLINIC | Age: 85
End: 2025-01-22
Payer: MEDICARE

## 2025-01-24 DIAGNOSIS — I10 ESSENTIAL HYPERTENSION, BENIGN: ICD-10-CM

## 2025-01-27 RX ORDER — LISINOPRIL 10 MG/1
10 TABLET ORAL DAILY
Qty: 90 TABLET | Refills: 1 | Status: SHIPPED | OUTPATIENT
Start: 2025-01-27

## 2025-01-31 ENCOUNTER — HOSPITAL ENCOUNTER (OUTPATIENT)
Dept: RADIOLOGY | Facility: CLINIC | Age: 85
Discharge: HOME | End: 2025-01-31
Payer: MEDICARE

## 2025-01-31 DIAGNOSIS — R31.0 GROSS HEMATURIA: ICD-10-CM

## 2025-01-31 PROCEDURE — 2550000001 HC RX 255 CONTRASTS: Performed by: STUDENT IN AN ORGANIZED HEALTH CARE EDUCATION/TRAINING PROGRAM

## 2025-01-31 PROCEDURE — 76377 3D RENDER W/INTRP POSTPROCES: CPT

## 2025-01-31 RX ADMIN — IOHEXOL 90 ML: 350 INJECTION, SOLUTION INTRAVENOUS at 11:50

## 2025-02-12 ENCOUNTER — APPOINTMENT (OUTPATIENT)
Dept: UROLOGY | Facility: CLINIC | Age: 85
End: 2025-02-12
Payer: MEDICARE

## 2025-02-12 VITALS
BODY MASS INDEX: 27.62 KG/M2 | DIASTOLIC BLOOD PRESSURE: 97 MMHG | HEART RATE: 80 BPM | TEMPERATURE: 98 F | WEIGHT: 187 LBS | SYSTOLIC BLOOD PRESSURE: 181 MMHG

## 2025-02-12 DIAGNOSIS — N13.8 BENIGN PROSTATIC HYPERPLASIA WITH URINARY OBSTRUCTION: ICD-10-CM

## 2025-02-12 DIAGNOSIS — N40.1 BENIGN PROSTATIC HYPERPLASIA WITH URINARY OBSTRUCTION: ICD-10-CM

## 2025-02-12 DIAGNOSIS — R31.0 GROSS HEMATURIA: Primary | ICD-10-CM

## 2025-02-12 LAB
POC APPEARANCE, URINE: CLEAR
POC BILIRUBIN, URINE: NEGATIVE
POC BLOOD, URINE: ABNORMAL
POC COLOR, URINE: YELLOW
POC GLUCOSE, URINE: NEGATIVE MG/DL
POC KETONES, URINE: NEGATIVE MG/DL
POC LEUKOCYTES, URINE: NEGATIVE
POC NITRITE,URINE: NEGATIVE
POC PH, URINE: 5.5 PH
POC PROTEIN, URINE: NEGATIVE MG/DL
POC SPECIFIC GRAVITY, URINE: 1.02
POC UROBILINOGEN, URINE: 0.2 EU/DL

## 2025-02-12 PROCEDURE — 81003 URINALYSIS AUTO W/O SCOPE: CPT | Performed by: STUDENT IN AN ORGANIZED HEALTH CARE EDUCATION/TRAINING PROGRAM

## 2025-02-12 PROCEDURE — 99214 OFFICE O/P EST MOD 30 MIN: CPT | Performed by: STUDENT IN AN ORGANIZED HEALTH CARE EDUCATION/TRAINING PROGRAM

## 2025-02-12 PROCEDURE — 52000 CYSTOURETHROSCOPY: CPT | Performed by: STUDENT IN AN ORGANIZED HEALTH CARE EDUCATION/TRAINING PROGRAM

## 2025-02-12 NOTE — PROGRESS NOTES
Scribed for Dr. Ochoa Brock by Abran Sanchez. I, Dr. Ochoa Brock have personally reviewed and agreed with the information entered by the Virtual Scribe. 02/12/25.    ASSESSMENT:  Problem List Items Addressed This Visit       Gross hematuria - Primary    Relevant Orders    Cystourethroscopy (Completed)    POCT UA Automated manually resulted (Completed)    Cystoscopy     Other Visit Diagnoses       Benign prostatic hyperplasia with urinary obstruction               PLAN:  #Gross hematuria  Hematuria work-up complete.   Negative for concerning pathology.   Source of his hematuria likely 2/2 BPH.   Provided reassurance.     #BPH (45g)  I recommended finasteride therapy for his BPH with hematuria   Risks, benefits, and alternatives discussed.   He declined for now and elects to continue surveillance   Advised to call if he would like to start or if his hematuria recurs  RTC in 1 year for reassessment and UA.     All questions were answered to the patient’s satisfaction.  Patient agrees with the plan and wishes to proceed.  Continue follow-up for ongoing care of his chronic medical conditions.       History of Present Illness (HPI):  Bautista presents for cystoscopy.   The patient’s EMR has been reviewed.  Lives in [place], [place].    Hx of painless gross hematuria intermittently.   Saw his PCP initially, note reviewed. (01/07/24)   UA negative, no prior hx of stones.   Continues anticoagulation (Xarelto 20 mg)  KUB (01/07/25) showed a potential stone.   He is a former smoker (30 pack year hx)  Referred to me for continued care.     TODAY: (02/12/25)  Presents for cystoscopy and CT results.   Accompanied by his Son who provides additional hx.   Reports he has been doing well overall.   States his gross hematuria resolved in the interim.   No acute or worsening complaints at this time.   Denies any bothersome urinary symptoms.     CTU (01/31/25)) personally reviewed and independently interpreted. 2/12/25  Kidneys  appear symmetric in size  No renal mass, stones or hydronephrosis bilaterally.  No filling defects bilaterally.   Prostate size: 45g (moderately enlarged)  Bladder appears normal.     TO REVIEW: Initial visit (01/08/25)  Reports he has been doing well overall.   Acknowledges episodes of painless gross hematuria.   Denies hx of UTI's, or gross hematuria until recently.  He is a former smoker (30 pack year hx)  No urinary issues.   IPSS 7.     KUB (01/07/25) personally reviewed.   Focal opacification in the left paramidline mid abdomen could reflect fecal content or ureteral calculus.    PMH: PAD, AFIB, ischemic cardiomyopathy  PSH: Amputation of lower extremity, CA angioplasty, carotid endarterectomy, hip replacement (partial), hernia repair, arterial angioplasty.  FH: Denies FH of  malignancy.   SH: Former smoker.     Past Medical History:   Diagnosis Date    Acute posthemorrhagic anemia 11/12/2021    Postoperative anemia due to acute blood loss    Atherosclerotic peripheral vascular disease with intermittent claudication ([facility]) 07/23/2019    Formatting of this note might be different from the original. History: 80 yo patient with history of BK amputation in the past. He had right femoral endarterectomy and patch right SFA stent many years ago. He presented in the office with a progressive leg pain and claudication of the right calf short distance. Also, he had some symptoms and ischemic pain in the left stump. The patient had abnormal    Bilateral carotid artery stenosis 04/19/2023    Last Assessment & Plan: Formatting of this note might be different from the original. Assessment: see most recent US above. Asymptomatic. Monitored by cardiology/vascular.    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    Paroxysmal atrial fibrillation  (Multi) 2021    PAF (paroxysmal atrial fibrillation)    Personal history of other diseases of the circulatory system     History of vascular disorder    Personal history of other diseases of the circulatory system 2021    History of hypertension     Past Surgical History:   Procedure Laterality Date    CAROTID ARTERY ANGIOPLASTY Bilateral     CAROTID ENDARTERECTOMY Right     OTHER SURGICAL HISTORY  2019    Lower extremity amputation below knee    OTHER SURGICAL HISTORY  2019    Lower extremity amputation    OTHER SURGICAL HISTORY  2019    Hip replacement partial    OTHER SURGICAL HISTORY  2022    Colonoscopy    OTHER SURGICAL HISTORY  2021    Lower extremity amputation above knee    OTHER SURGICAL HISTORY  2021    Hernia repair    OTHER SURGICAL HISTORY  2021    Arterial angioplasty of lower extremity    OTHER SURGICAL HISTORY  2021    Surgery     Family History   Problem Relation Name Age of Onset    Cancer Mother       Social History     Tobacco Use   Smoking Status Former    Current packs/day: 0.00    Average packs/day: 0.3 packs/day for 45.0 years (11.3 ttl pk-yrs)    Types: Cigarettes    Start date:     Quit date:     Years since quittin.1   Smokeless Tobacco Never     Current Outpatient Medications   Medication Sig Dispense Refill    acetaminophen (Tylenol) 500 mg tablet Take 2 tablets (1,000 mg) by mouth every 6 hours if needed for mild pain (1 - 3).      amiodarone (Pacerone) 200 mg tablet Take 1 tablet (200 mg) by mouth once daily.      arnica 20 % tincture Apply 2 mL topically 2 times a day. (Patient not taking: Reported on 2025) 120 mL 1    aspirin 81 mg EC tablet Take 1 tablet (81 mg) by mouth once daily.      atorvastatin (Lipitor) 40 mg tablet TAKE 1 TABLET BY MOUTH AT  BEDTIME 90 tablet 3    cholecalciferol (Vitamin D-3) 50 MCG (2000 UT) tablet Take 1 tablet (2,000 Units) by mouth once daily.      cyclobenzaprine (Flexeril) 5  mg tablet TAKE 1 TABLET(5 MG) BY MOUTH DAILY AT BEDTIME (Patient not taking: Reported on 11/18/2024) 90 tablet 1    docusate sodium (Colace) 100 mg capsule TAKE 1 CAPSULE BY MOUTH TWICE DAILY 180 capsule 1    FeroSuL tablet TAKE 1 TABLET BY MOUTH DAILY AS DIRECTED 90 tablet 1    fluticasone propion-salmeteroL (Advair Diskus) 250-50 mcg/dose diskus inhaler INHALE 1 PUFF BY MOUTH EVERY 12 HOURS (Patient not taking: Reported on 1/8/2025) 180 each 1    lisinopril 10 mg tablet TAKE 1 TABLET(10 MG) BY MOUTH EVERY DAY 90 tablet 1    metoprolol tartrate (Lopressor) 25 mg tablet Take 0.5 tablets (12.5 mg) by mouth once daily in the morning. 360 tablet 3    Xarelto 20 mg tablet TAKE 1 TABLET BY MOUTH ONCE  DAILY IN THE EVENING 90 tablet 1     No current facility-administered medications for this visit.     Allergies   Allergen Reactions    Hydromorphone Nausea/vomiting     Patient gets nauseas     Past medical, surgical, family and social history in the chart was reviewed and is accurate including any additions to what is in this HPI.    REVIEW OF SYSTEMS (ROS):   Constitutional: denies any unintentional weight loss or change in strength.  Integumentary: denies any rashes or pruritus.  Eyes: denies any double vision or eye pain.  Ear/Nose/Mouth/Throat: denies any nosebleeds or gum bleeds.  Cardiovascular: denies any chest pain or syncope.  Respiratory: denies hemoptysis.  Gastrointestinal: denies nausea or vomiting.  Musculoskeletal: denies muscle cramping or weakness.  Neurologic: denies convulsions or seizures.  Hematologic/Lymphatic: denies bleeding tendencies.  Endocrine: denies heat/cold intolerance.  All other systems have been reviewed and are negative unless otherwise noted in the HPI.     OBJECTIVE:  Visit Vitals  BP (!) 181/97   Pulse 80   Temp 36.7 °C (98 °F) (Temporal)     PHYSICAL EXAM:  Constitutional: No obvious distress.  Eyes: Non-injected conjunctiva, sclera clear, EOMI.  Ears/Nose/Mouth/Throat: No obvious  drainage per ears or nose.  Cardiovascular: Extremities are warm and well perfused. No edema, cyanosis or pallor.  Respiratory: No audible wheezing/stridor; respirations do not appear labored.  Gastrointestinal: Abdomen soft, not distended.  Musculoskeletal: Normal ROM of extremities.  Skin: No obvious rashes or open sores.  Neurologic: Alert and oriented, CN 2-12 grossly intact.  Psychiatric: Answers questions appropriately with normal affect.  Hematologic/Lymphatic/Immunologic: No obvious bruises or sites of spontaneous bleeding.  Genitourinary: No CVA tenderness, bladder not palpable.     LABS & IMAGING:  Basic Labs:  Lab Results   Component Value Date    WBC 12.8 (H) 01/07/2025    HGB 13.0 (L) 01/07/2025    HCT 41.4 01/07/2025     01/07/2025     01/07/2025    K 4.1 01/07/2025    CL 98 01/07/2025    ALT 16 01/07/2025    AST 18 01/07/2025    CREATININE 0.91 01/07/2025    BUN 18 01/07/2025    CO2 29 01/07/2025    TSH 2.77 01/07/2025    INR 2.1 (H) 01/07/2025     Prostate cancer screening:  Lab Results   Component Value Date    PSA 0.60 05/08/2019     Hemoglobin A1c:  Lab Results   Component Value Date    HGBA1C 5.2 01/07/2025     Scribed for Dr. Ochoa Brock by Abran Sanchez.  By signing my name below, I, Portia Dunaway attest that this documentation has been prepared under the direction and in the presence of Ochoa Brock MD. All medical record entries made by the Scribe were at my direction or personally dictated by me. I have reviewed the chart and agree that the record accurately reflects my personal performance of the history, physical exam, discussion and plan.

## 2025-02-12 NOTE — PROGRESS NOTES
Patient ID: Bautista Mendoza is a 84 y.o. male.    Cystoscopy    Date/Time: 2/12/2025 10:25 AM    Performed by: Ochoa Brock MD  Authorized by: Ochoa Brock MD    Procedure - Bladder Cystoscopy:     Procedure details: cystoscopy    Post-procedure:     Patient tolerance: Patient tolerated the procedure well with no immediate complications      Cystoscopy performed:   Bautista Mendoza identified using two (2) forms of identification.  Procedure: diagnostic cystourethroscopy  Indications for procedure: gross hematuria.   Risks, benefits, and alternatives were discussed in detail.   Patient appears to understand and agrees to proceed.   Patient has signed the procedure consent form.     Cystoscopy findings:  Urethra: normal course and caliber, no evidence of stricture or lesion.  Prostate: moderately obstructing bi-lobar hypertrophy, with large prostatic vessels.   Bladder: normal capacity, moderate trabeculations, no diverticulum, no stone, tumors or other lesions.

## 2025-02-19 ENCOUNTER — APPOINTMENT (OUTPATIENT)
Dept: PRIMARY CARE | Facility: CLINIC | Age: 85
End: 2025-02-19
Payer: MEDICARE

## 2025-02-20 ENCOUNTER — APPOINTMENT (OUTPATIENT)
Dept: PRIMARY CARE | Facility: CLINIC | Age: 85
End: 2025-02-20
Payer: MEDICARE

## 2025-02-20 VITALS
SYSTOLIC BLOOD PRESSURE: 154 MMHG | BODY MASS INDEX: 27.7 KG/M2 | WEIGHT: 187 LBS | HEIGHT: 69 IN | HEART RATE: 82 BPM | DIASTOLIC BLOOD PRESSURE: 98 MMHG

## 2025-02-20 DIAGNOSIS — I25.5 ISCHEMIC DILATED CARDIOMYOPATHY (MULTI): ICD-10-CM

## 2025-02-20 DIAGNOSIS — Z79.01 ANTICOAGULATED: ICD-10-CM

## 2025-02-20 DIAGNOSIS — E78.5 DYSLIPIDEMIA: ICD-10-CM

## 2025-02-20 DIAGNOSIS — R73.9 HYPERGLYCEMIA: ICD-10-CM

## 2025-02-20 DIAGNOSIS — I48.0 PAROXYSMAL ATRIAL FIBRILLATION (MULTI): ICD-10-CM

## 2025-02-20 DIAGNOSIS — I72.4 FEMORAL ARTERY ANEURYSM, RIGHT (CMS-HCC): ICD-10-CM

## 2025-02-20 DIAGNOSIS — I25.5 ISCHEMIC CARDIOMYOPATHY: ICD-10-CM

## 2025-02-20 DIAGNOSIS — J43.9 PULMONARY EMPHYSEMA, UNSPECIFIED EMPHYSEMA TYPE (MULTI): ICD-10-CM

## 2025-02-20 DIAGNOSIS — I73.9 CLAUDICATION OF RIGHT LOWER EXTREMITY (CMS-HCC): ICD-10-CM

## 2025-02-20 DIAGNOSIS — R73.09 ELEVATED HEMOGLOBIN A1C: ICD-10-CM

## 2025-02-20 DIAGNOSIS — I42.0 ISCHEMIC DILATED CARDIOMYOPATHY (MULTI): ICD-10-CM

## 2025-02-20 DIAGNOSIS — I10 ESSENTIAL HYPERTENSION, BENIGN: Primary | ICD-10-CM

## 2025-02-20 DIAGNOSIS — E55.9 VITAMIN D DEFICIENCY: ICD-10-CM

## 2025-02-20 PROCEDURE — 3080F DIAST BP >= 90 MM HG: CPT | Performed by: INTERNAL MEDICINE

## 2025-02-20 PROCEDURE — 99214 OFFICE O/P EST MOD 30 MIN: CPT | Performed by: INTERNAL MEDICINE

## 2025-02-20 PROCEDURE — 3077F SYST BP >= 140 MM HG: CPT | Performed by: INTERNAL MEDICINE

## 2025-02-20 PROCEDURE — 1036F TOBACCO NON-USER: CPT | Performed by: INTERNAL MEDICINE

## 2025-02-20 PROCEDURE — G2211 COMPLEX E/M VISIT ADD ON: HCPCS | Performed by: INTERNAL MEDICINE

## 2025-02-20 PROCEDURE — 1123F ACP DISCUSS/DSCN MKR DOCD: CPT | Performed by: INTERNAL MEDICINE

## 2025-02-20 PROCEDURE — 1159F MED LIST DOCD IN RCRD: CPT | Performed by: INTERNAL MEDICINE

## 2025-02-20 RX ORDER — LISINOPRIL 20 MG/1
20 TABLET ORAL NIGHTLY
Qty: 90 TABLET | Refills: 1 | Status: SHIPPED | OUTPATIENT
Start: 2025-02-20

## 2025-02-20 RX ORDER — FLUTICASONE PROPIONATE AND SALMETEROL 250; 50 UG/1; UG/1
1 POWDER RESPIRATORY (INHALATION) EVERY 12 HOURS
Qty: 180 EACH | Refills: 1 | Status: SHIPPED | OUTPATIENT
Start: 2025-02-20

## 2025-02-20 ASSESSMENT — LIFESTYLE VARIABLES
AUDIT-C TOTAL SCORE: 4
AUDIT TOTAL SCORE: -1
SKIP TO QUESTIONS 9-10: 0
HOW OFTEN DO YOU HAVE SIX OR MORE DRINKS ON ONE OCCASION: NEVER
HOW MANY STANDARD DRINKS CONTAINING ALCOHOL DO YOU HAVE ON A TYPICAL DAY: 3 OR 4
HAS A RELATIVE, FRIEND, DOCTOR, OR ANOTHER HEALTH PROFESSIONAL EXPRESSED CONCERN ABOUT YOUR DRINKING OR SUGGESTED YOU CUT DOWN: NO
HAVE YOU OR SOMEONE ELSE BEEN INJURED AS A RESULT OF YOUR DRINKING: NO
HOW OFTEN DO YOU HAVE A DRINK CONTAINING ALCOHOL: 2-3 TIMES A WEEK

## 2025-02-20 ASSESSMENT — PATIENT HEALTH QUESTIONNAIRE - PHQ9
2. FEELING DOWN, DEPRESSED OR HOPELESS: NOT AT ALL
SUM OF ALL RESPONSES TO PHQ9 QUESTIONS 1 AND 2: 0
1. LITTLE INTEREST OR PLEASURE IN DOING THINGS: NOT AT ALL

## 2025-02-20 NOTE — PROGRESS NOTES
Assessment/Plan   84 years old male with multiple chronic medical problems was seen for a follow-up visit.  His blood pressure is consistently elevated and after detailed discussion it was decided that we will increase the lisinopril.  Patient verbalizes the understanding to take 2 tablets what is at home and then when the new prescription arrives he will take 1 tablet a day total of 20 mg lisinopril a day.  Side effects benefits were reviewed and discussed.    Patient's ischemic cardiomyopathy and paroxysmal atrial fibrillation is stable and his heart rate is well-controlled and he is on anticoagulation which she is tolerating it now.    We discussed about his elevated blood sugars and the hyperlipidemia and the importance of diet control.    Patient will have the blood test as ordered including his hemoglobin A1c prior to his next visit.  He is continuing his vitamin D replacement and the levels will be followed up.    Patient's chronic peripheral vascular disease was discussed in he says he is stable and he will follow-up with the vascular surgeons.          Problem List Items Addressed This Visit       Atrial fibrillation (Multi)    Chronic obstructive pulmonary disease (Multi)    Relevant Medications    fluticasone propion-salmeteroL (Advair Diskus) 250-50 mcg/dose diskus inhaler    Claudication of right lower extremity (CMS-HCC)    Dyslipidemia    Relevant Orders    Lipid Panel    TSH with reflex to Free T4 if abnormal    Elevated hemoglobin A1c    Relevant Orders    Hemoglobin A1C    Essential hypertension, benign - Primary    Relevant Medications    lisinopril 20 mg tablet    Other Relevant Orders    Comprehensive Metabolic Panel    Femoral artery aneurysm, right (CMS-HCC)    Hyperglycemia    Relevant Orders    Hemoglobin A1C    TSH with reflex to Free T4 if abnormal    Ischemic dilated cardiomyopathy (Multi)    Vitamin D deficiency    Relevant Orders    Vitamin D 25-Hydroxy,Total (for eval of Vitamin D  levels)    Anticoagulated    Relevant Orders    CBC and Auto Differential     Other Visit Diagnoses       Ischemic cardiomyopathy        Relevant Orders    TSH with reflex to Free T4 if abnormal            Subjective     Patient ID: Bautista Mendoza is a 84 y.o. male who presents for Follow-up.    History of present illness  84 years old male who has multiple chronic medical problems lives independently with his wife in a house came for a follow-up visit.  His wife was in the room during this office visit.    Since his last visit he has had the follow-up with the urologist for the hematuria and he is improved.  He denies any blood in the urine at this time.  He denies any frequent urination or burning urination.    He is trying to be active and he is works in his garage and he has a heat in the garage.  He is tolerating his anticoagulation he denies any palpitation.    He does not check his blood pressure at home and his blood pressures were consistently elevated during the last few visits.    I discussed in detail with the patient about his COPD and wheezing and exam.  I advised him to continue the inhalers and the prescription was sent to the pharmacy.    He is right leg cramping and pain are stable and he says he can manage with it.    He does not watch the diet but he says that he will try to be active.    Rest of the review of systems no acute complaints.    Family History   Problem Relation Name Age of Onset    Cancer Mother        Social History     Socioeconomic History    Marital status:      Spouse name: Not on file    Number of children: Not on file    Years of education: Not on file    Highest education level: Not on file   Occupational History    Not on file   Tobacco Use    Smoking status: Former     Current packs/day: 0.00     Average packs/day: 0.3 packs/day for 45.0 years (11.3 ttl pk-yrs)     Types: Cigarettes     Start date:      Quit date:      Years since quittin.1     Smokeless tobacco: Never   Vaping Use    Vaping status: Never Used   Substance and Sexual Activity    Alcohol use: Yes     Comment: occassion    Drug use: Never    Sexual activity: Not on file   Other Topics Concern    Not on file   Social History Narrative    Not on file     Social Drivers of Health     Financial Resource Strain: Low Risk  (4/27/2023)    Received from [facility]    Overall Financial Resource Strain (CARDIA)     Difficulty of Paying Living Expenses: Not hard at all   Food Insecurity: No Food Insecurity (4/27/2023)    Received from [facility]    Hunger Vital Sign     Worried About Running Out of Food in the Last Year: Never true     Ran Out of Food in the Last Year: Never true   Transportation Needs: No Transportation Needs (4/27/2023)    Received from [facility]    PRAPARE - Transportation     Lack of Transportation (Medical): No     Lack of Transportation (Non-Medical): No   Physical Activity: Not on file   Stress: Not on file   Social Connections: Not on file   Intimate Partner Violence: Not on file   Housing Stability: Unknown (4/27/2023)    Received from [facility]    Housing Stability Vital Sign     Unable to Pay for Housing in the Last Year: No     Number of Places Lived in the Last Year: Not on file     Unstable Housing in the Last Year: No      Hydromorphone   Current Outpatient Medications   Medication Sig Dispense Refill    amiodarone (Pacerone) 200 mg tablet Take 1 tablet (200 mg) by mouth once daily.      aspirin 81 mg EC tablet Take 1 tablet (81 mg) by mouth once daily.      atorvastatin (Lipitor) 40 mg tablet TAKE 1 TABLET BY MOUTH AT  BEDTIME 90 tablet 3    cholecalciferol (Vitamin D-3) 50 MCG (2000 UT) tablet Take 1 tablet (2,000 Units) by mouth once daily.      docusate sodium (Colace) 100 mg capsule TAKE 1 CAPSULE BY MOUTH TWICE DAILY 180 capsule 1    FeroSuL tablet TAKE 1 TABLET BY  MOUTH DAILY AS DIRECTED 90 tablet 1    metoprolol tartrate (Lopressor) 25 mg tablet Take 0.5 tablets (12.5 mg) by mouth once daily in the morning. 360 tablet 3    Xarelto 20 mg tablet TAKE 1 TABLET BY MOUTH ONCE  DAILY IN THE EVENING 90 tablet 1    fluticasone propion-salmeteroL (Advair Diskus) 250-50 mcg/dose diskus inhaler Inhale 1 puff every 12 hours. 180 each 1    lisinopril 20 mg tablet Take 1 tablet (20 mg) by mouth once daily at bedtime. 90 tablet 1     No current facility-administered medications for this visit.      Objective     Vitals:    02/20/25 0956   BP: (!) 154/98   Pulse: 82        Physical Exam    Normal-built, well-nourished with no apparent distress. Alert oriented  Skin: Normal turgor. No rash.  Has slightly decreased hearing.  Head: Normocephalic, atraumatic.  Eyes: Pupils are equal, round,.  No pallor of conjunctivae.  Mucous membranes are moist  Neck: Supple. No JVD.  No thyromegaly. No cervical lymphadenopathy.    Left neck scar healed. Right neck scar healing.   No clubbing, has significant peripheral osteoarthritis noted,   Chest: Vesicular breathing Bilaterally moderate air entry and rare wheezing.  No crackles.  Heart: Irregular rate and rhythm controlled. S1, S2 positive.   Abdomen: Soft and nontender. Bowel sounds are positive. No organomegaly.  Extremities: Right leg no pedal pitting edema.    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  patient is able to bear weight on the left leg with amputation and the prosthesis.  Right leg pedal pulses are difficult to palpate but no evidence of acute ischemia  Neuro Exam: No focal signs. Able to walk with the prostheses independently.      Problem List Items Addressed This Visit       Atrial fibrillation (Multi)    Chronic obstructive pulmonary disease (Multi)    Relevant Medications    fluticasone propion-salmeteroL (Advair Diskus) 250-50 mcg/dose diskus inhaler    Claudication of right lower extremity (CMS-HCC)    Dyslipidemia     Relevant Orders    Lipid Panel    TSH with reflex to Free T4 if abnormal    Elevated hemoglobin A1c    Relevant Orders    Hemoglobin A1C    Essential hypertension, benign - Primary    Relevant Medications    lisinopril 20 mg tablet    Other Relevant Orders    Comprehensive Metabolic Panel    Femoral artery aneurysm, right (CMS-HCC)    Hyperglycemia    Relevant Orders    Hemoglobin A1C    TSH with reflex to Free T4 if abnormal    Ischemic dilated cardiomyopathy (Multi)    Vitamin D deficiency    Relevant Orders    Vitamin D 25-Hydroxy,Total (for eval of Vitamin D levels)    Anticoagulated    Relevant Orders    CBC and Auto Differential     Other Visit Diagnoses       Ischemic cardiomyopathy        Relevant Orders    TSH with reflex to Free T4 if abnormal             Orders Placed This Encounter   Procedures    CBC and Auto Differential     Standing Status:   Future     Standing Expiration Date:   2/20/2026     Order Specific Question:   Release result to [facility]     Answer:   Immediate    Comprehensive Metabolic Panel     Standing Status:   Future     Standing Expiration Date:   2/20/2026     Order Specific Question:   Release result to [facility]     Answer:   Immediate    Lipid Panel     fasting     Standing Status:   Future     Standing Expiration Date:   8/20/2025     Order Specific Question:   Release result to [facility] [employer]     Answer:   Immediate [1]    Hemoglobin A1C     Standing Status:   Future     Number of Occurrences:   1     Standing Expiration Date:   2/20/2026     Order Specific Question:   Release result to [facility]     Answer:   Immediate    Vitamin D 25-Hydroxy,Total (for eval of Vitamin D levels)     Standing Status:   Future     Number of Occurrences:   1     Standing Expiration Date:   2/20/2026     Order Specific Question:   Release result to [facility]     Answer:   Immediate    TSH with reflex to Free T4 if abnormal     Standing Status:   Future     Standing Expiration Date:   2/20/2026     Order Specific  Question:   Release result to [facility]     Answer:   Immediate        Lab Results   Component Value Date    WBC 12.8 (H) 01/07/2025    HGB 13.0 (L) 01/07/2025    HCT 41.4 01/07/2025     01/07/2025    CHOL 103 01/07/2025    TRIG 84 01/07/2025    HDL 45.7 01/07/2025    ALT 16 01/07/2025    AST 18 01/07/2025     01/07/2025    K 4.1 01/07/2025    CL 98 01/07/2025    CREATININE 0.91 01/07/2025    BUN 18 01/07/2025    CO2 29 01/07/2025    TSH 2.77 01/07/2025    PSA 0.60 05/08/2019    INR 2.1 (H) 01/07/2025    HGBA1C 5.2 01/07/2025     Lab Results   Component Value Date    CHOL 103 01/07/2025    LDLCALC 41 01/07/2025    CHHDL 2.3 01/07/2025       CT urography w 3D volume rendered imaging    Result Date: 2/1/2025  Interpreted By:  Jai Mcgowan, STUDY: CT UROGRAPHY WITH 3D VOLUME RENDERED IMAGING;  1/31/2025 11:49 am   INDICATION: Signs/Symptoms:Gross Hematuria.   ,R31.0 Gross hematuria   COMPARISON: CT abdomen/pelvis dated 05/10/2019.   ACCESSION NUMBER(S): [id number]   ORDERING CLINICIAN: CORTES NI   TECHNIQUE: CT of the abdomen and pelvis was performed.  TRIPLE PHASE TECHNIQUE: Contiguous axial images of the abdomen and pelvis were obtained at 3mm slice thickness before, 110sec and 10min after intravenous contrast administration. Oral ingestion of up to 900ml or water was encouraged prior to the study. Multiplanar reformats in coronal and sagittal reconstructions at 3 mm slice thickness were performed. 3D reconstructions were performed on an independent workstation and provided for review.  90 ML of Omnipaque 350 was administered intravenously without immediate complication.   FINDINGS: LOWER CHEST: There is bibasilar atelectasis. Otherwise, the partially visualized lower lungs are unremarkable. The heart is normal in size without significant pericardial effusion. There are severe coronary artery calcifications. The distal esophagus is unremarkable. There is ectasia of the distal descending thoracic  aorta with severe atherosclerotic calcifications.   ABDOMEN: Of note, there is limited evaluation of the pelvis secondary to beam hardening artifact from left hip arthroplasty hardware. Within these limitations:   KIDNEYS AND URETERS: The kidneys have normal size and enhance symmetrically. There is a 1.6 cm simple fluid attenuating cyst within the right interpolar renal cortex. No hydroureteronephrosis or nephroureterolithiasis is present. No evidence of intraluminal filling defects within the bilateral pelvocaliceal system or bilateral ureters on delayed phase imaging, although again note that there is limited evaluation of the distal left ureter secondary to beam hardening artifact from left hip arthroplasty hardware.   BLADDER: The visualized portions of the urinary bladder are unremarkable in appearance without evidence of abnormal wall thickening. No evidence of intraluminal filling defects on delayed phase imaging within the limitations described above.   LIVER: The liver is normal in size without evidence of focal liver lesions.   BILE DUCTS: The bile ducts are not dilated.   GALLBLADDER: There is layering hyperdensity within the gallbladder lumen likely reflecting gallbladder sludge.   PANCREAS: The pancreas is unremarkable in appearance. No pancreatic ductal dilatation.   SPLEEN: Numerous punctate calcified granulomas are again visualized within the spleen. The spleen is otherwise unremarkable in appearance.   ADRENAL GLANDS: Bilateral adrenal glands appear normal.   REPRODUCTIVE ORGANS: The visualized aspects of the prostate gland are unremarkable in appearance.   BOWEL: The stomach is decompressed and limited in evaluation, however, grossly unremarkable in appearance. There is scattered colonic diverticulosis without evidence of diverticulitis. Otherwise, the small and large bowel are normal in caliber without evidence of inflammatory wall thickening. The appendix is not definitely visualized. There is  however no pericecal stranding or fluid.   VESSELS: The aorta and IVC are within normal limits. Severe atherosclerotic calcifications of the abdominal aorta and its branching vessels are noted.   PERITONEUM/RETROPERITONEUM/LYMPH NODES: There is no free or loculated fluid collection, no free intraperitoneal air. No abdominopelvic lymphadenopathy by CT size criteria.   ABDOMINAL WALL: There is a small fat containing left inguinal hernia. There is a tiny fat containing umbilical hernia. The abdominal wall soft tissues are otherwise unremarkable in appearance.   BONE AND SOFT TISSUE: No acute osseous abnormalities or suspicious osseous lesions. Multilevel discogenic degenerative changes are noted throughout the lower thoracic and lumbar spine with scattered intervertebral disc space narrowing and vertebral body osteophytosis. Left hip arthroplasty hardware is noted without evidence of hardware complication.       1.  No hydroureteronephrosis or nephroureterolithiasis. No evidence of intraluminal filling defects within the bilateral pelvocaliceal system or bilateral ureters on delayed phase imaging, although again note that there is limited evaluation of the distal left ureter secondary to beam hardening artifact from left hip arthroplasty hardware. 2. The visualized portions of the urinary bladder are unremarkable in appearance without evidence of abnormal wall thickening. No evidence of intraluminal filling defects on delayed phase imaging within the limitations described above. 3. Gallbladder sludge. 4. Colonic diverticulosis without evidence of diverticulitis. 5. Small fat containing left inguinal hernia and tiny fat containing umbilical hernia. 6. Severe coronary artery calcifications. 7. Remaining chronic and incidental findings as described above.   MACRO: None   Signed by: Jai Mcgowan 2/1/2025 10:07 AM Dictation workstation:   [id number]     Patient was identified as a fall risk. Risk prevention instructions  provided.

## 2025-03-24 ENCOUNTER — APPOINTMENT (OUTPATIENT)
Dept: PRIMARY CARE | Facility: CLINIC | Age: 85
End: 2025-03-24
Payer: MEDICARE

## 2025-04-09 DIAGNOSIS — E78.5 DYSLIPIDEMIA: ICD-10-CM

## 2025-04-09 DIAGNOSIS — I48.0 PAROXYSMAL ATRIAL FIBRILLATION (MULTI): ICD-10-CM

## 2025-04-09 RX ORDER — RIVAROXABAN 20 MG/1
20 TABLET, FILM COATED ORAL EVERY EVENING
Qty: 90 TABLET | Refills: 1 | Status: SHIPPED | OUTPATIENT
Start: 2025-04-09

## 2025-04-09 RX ORDER — ATORVASTATIN CALCIUM 40 MG/1
40 TABLET, FILM COATED ORAL NIGHTLY
Qty: 90 TABLET | Refills: 1 | Status: SHIPPED | OUTPATIENT
Start: 2025-04-09

## 2025-05-20 DIAGNOSIS — E78.5 DYSLIPIDEMIA: ICD-10-CM

## 2025-05-20 DIAGNOSIS — R73.9 HYPERGLYCEMIA: ICD-10-CM

## 2025-05-20 DIAGNOSIS — Z79.01 ANTICOAGULATED: ICD-10-CM

## 2025-05-20 DIAGNOSIS — I10 ESSENTIAL HYPERTENSION, BENIGN: ICD-10-CM

## 2025-05-20 DIAGNOSIS — I25.5 ISCHEMIC CARDIOMYOPATHY: ICD-10-CM

## 2025-05-23 DIAGNOSIS — K59.09 OTHER CONSTIPATION: ICD-10-CM

## 2025-05-23 RX ORDER — DOCUSATE SODIUM 100 MG/1
100 CAPSULE, LIQUID FILLED ORAL 2 TIMES DAILY
Qty: 180 CAPSULE | Refills: 1 | Status: SHIPPED | OUTPATIENT
Start: 2025-05-23

## 2025-05-23 RX ORDER — AMIODARONE HYDROCHLORIDE 200 MG/1
200 TABLET ORAL DAILY
Qty: 90 TABLET | Refills: 1 | Status: CANCELLED | OUTPATIENT
Start: 2025-05-23

## 2025-06-13 DIAGNOSIS — D64.9 ANEMIA, UNSPECIFIED TYPE: ICD-10-CM

## 2025-06-13 RX ORDER — FERROUS SULFATE 325(65) MG
1 TABLET ORAL DAILY
Qty: 90 TABLET | Refills: 1 | Status: SHIPPED | OUTPATIENT
Start: 2025-06-13

## 2025-06-24 ENCOUNTER — APPOINTMENT (OUTPATIENT)
Dept: PRIMARY CARE | Facility: CLINIC | Age: 85
End: 2025-06-24
Payer: MEDICARE

## 2025-06-28 LAB
25(OH)D3+25(OH)D2 SERPL-MCNC: 35 NG/ML (ref 30–100)
ALBUMIN SERPL-MCNC: 3.9 G/DL (ref 3.6–5.1)
ALP SERPL-CCNC: 62 U/L (ref 35–144)
ALT SERPL-CCNC: 14 U/L (ref 9–46)
ANION GAP SERPL CALCULATED.4IONS-SCNC: 8 MMOL/L (CALC) (ref 7–17)
AST SERPL-CCNC: 15 U/L (ref 10–35)
BASOPHILS # BLD AUTO: 66 CELLS/UL (ref 0–200)
BASOPHILS NFR BLD AUTO: 0.6 %
BILIRUB SERPL-MCNC: 0.6 MG/DL (ref 0.2–1.2)
BUN SERPL-MCNC: 19 MG/DL (ref 7–25)
CALCIUM SERPL-MCNC: 8.7 MG/DL (ref 8.6–10.3)
CHLORIDE SERPL-SCNC: 100 MMOL/L (ref 98–110)
CHOLEST SERPL-MCNC: 106 MG/DL
CHOLEST/HDLC SERPL: 2 (CALC)
CO2 SERPL-SCNC: 29 MMOL/L (ref 20–32)
CREAT SERPL-MCNC: 0.93 MG/DL (ref 0.7–1.22)
EGFRCR SERPLBLD CKD-EPI 2021: 81 ML/MIN/1.73M2
EOSINOPHIL # BLD AUTO: 154 CELLS/UL (ref 15–500)
EOSINOPHIL NFR BLD AUTO: 1.4 %
ERYTHROCYTE [DISTWIDTH] IN BLOOD BY AUTOMATED COUNT: 12.5 % (ref 11–15)
EST. AVERAGE GLUCOSE BLD GHB EST-MCNC: 114 MG/DL
EST. AVERAGE GLUCOSE BLD GHB EST-SCNC: 6.3 MMOL/L
GLUCOSE SERPL-MCNC: 89 MG/DL (ref 65–99)
HBA1C MFR BLD: 5.6 %
HCT VFR BLD AUTO: 40 % (ref 38.5–50)
HDLC SERPL-MCNC: 53 MG/DL
HGB BLD-MCNC: 13.6 G/DL (ref 13.2–17.1)
LDLC SERPL CALC-MCNC: 39 MG/DL (CALC)
LYMPHOCYTES # BLD AUTO: 3168 CELLS/UL (ref 850–3900)
LYMPHOCYTES NFR BLD AUTO: 28.8 %
MCH RBC QN AUTO: 32.9 PG (ref 27–33)
MCHC RBC AUTO-ENTMCNC: 34 G/DL (ref 32–36)
MCV RBC AUTO: 96.9 FL (ref 80–100)
MONOCYTES # BLD AUTO: 1254 CELLS/UL (ref 200–950)
MONOCYTES NFR BLD AUTO: 11.4 %
NEUTROPHILS # BLD AUTO: 6358 CELLS/UL (ref 1500–7800)
NEUTROPHILS NFR BLD AUTO: 57.8 %
NONHDLC SERPL-MCNC: 53 MG/DL (CALC)
PLATELET # BLD AUTO: 251 THOUSAND/UL (ref 140–400)
PMV BLD REES-ECKER: 10 FL (ref 7.5–12.5)
POTASSIUM SERPL-SCNC: 5.3 MMOL/L (ref 3.5–5.3)
PROT SERPL-MCNC: 6.4 G/DL (ref 6.1–8.1)
RBC # BLD AUTO: 4.13 MILLION/UL (ref 4.2–5.8)
SODIUM SERPL-SCNC: 137 MMOL/L (ref 135–146)
TRIGL SERPL-MCNC: 59 MG/DL
TSH SERPL-ACNC: 2.02 MIU/L (ref 0.4–4.5)
WBC # BLD AUTO: 11 THOUSAND/UL (ref 3.8–10.8)

## 2025-07-01 ENCOUNTER — APPOINTMENT (OUTPATIENT)
Dept: PRIMARY CARE | Facility: CLINIC | Age: 85
End: 2025-07-01
Payer: MEDICARE

## 2025-07-01 ENCOUNTER — TELEPHONE (OUTPATIENT)
Dept: PRIMARY CARE | Facility: CLINIC | Age: 85
End: 2025-07-01

## 2025-07-01 VITALS
BODY MASS INDEX: 26.81 KG/M2 | WEIGHT: 181 LBS | DIASTOLIC BLOOD PRESSURE: 98 MMHG | SYSTOLIC BLOOD PRESSURE: 144 MMHG | HEART RATE: 61 BPM | HEIGHT: 69 IN

## 2025-07-01 DIAGNOSIS — I25.5 ISCHEMIC DILATED CARDIOMYOPATHY (MULTI): ICD-10-CM

## 2025-07-01 DIAGNOSIS — I10 ESSENTIAL HYPERTENSION, BENIGN: ICD-10-CM

## 2025-07-01 DIAGNOSIS — Z79.01 ANTICOAGULATED: ICD-10-CM

## 2025-07-01 DIAGNOSIS — M79.604 CHRONIC PAIN OF RIGHT LOWER EXTREMITY: Primary | ICD-10-CM

## 2025-07-01 DIAGNOSIS — I48.0 PAROXYSMAL ATRIAL FIBRILLATION (MULTI): ICD-10-CM

## 2025-07-01 DIAGNOSIS — S88.112S BELOW-KNEE AMPUTATION OF LEFT LOWER EXTREMITY, SEQUELA: ICD-10-CM

## 2025-07-01 DIAGNOSIS — E78.5 DYSLIPIDEMIA: ICD-10-CM

## 2025-07-01 DIAGNOSIS — I42.0 ISCHEMIC DILATED CARDIOMYOPATHY (MULTI): ICD-10-CM

## 2025-07-01 DIAGNOSIS — J42 CHRONIC BRONCHITIS, UNSPECIFIED CHRONIC BRONCHITIS TYPE (MULTI): ICD-10-CM

## 2025-07-01 DIAGNOSIS — I25.10 ARTERIOSCLEROSIS OF CORONARY ARTERY: ICD-10-CM

## 2025-07-01 DIAGNOSIS — I73.9 CLAUDICATION OF RIGHT LOWER EXTREMITY: ICD-10-CM

## 2025-07-01 DIAGNOSIS — R73.09 ELEVATED HEMOGLOBIN A1C: ICD-10-CM

## 2025-07-01 DIAGNOSIS — G89.29 CHRONIC PAIN OF RIGHT LOWER EXTREMITY: Primary | ICD-10-CM

## 2025-07-01 PROCEDURE — 1160F RVW MEDS BY RX/DR IN RCRD: CPT | Performed by: INTERNAL MEDICINE

## 2025-07-01 PROCEDURE — 3080F DIAST BP >= 90 MM HG: CPT | Performed by: INTERNAL MEDICINE

## 2025-07-01 PROCEDURE — 3077F SYST BP >= 140 MM HG: CPT | Performed by: INTERNAL MEDICINE

## 2025-07-01 PROCEDURE — 1159F MED LIST DOCD IN RCRD: CPT | Performed by: INTERNAL MEDICINE

## 2025-07-01 PROCEDURE — 1036F TOBACCO NON-USER: CPT | Performed by: INTERNAL MEDICINE

## 2025-07-01 PROCEDURE — 99214 OFFICE O/P EST MOD 30 MIN: CPT | Performed by: INTERNAL MEDICINE

## 2025-07-01 PROCEDURE — G2211 COMPLEX E/M VISIT ADD ON: HCPCS | Performed by: INTERNAL MEDICINE

## 2025-07-01 PROCEDURE — 1158F ADVNC CARE PLAN TLK DOCD: CPT | Performed by: INTERNAL MEDICINE

## 2025-07-01 RX ORDER — OXYCODONE AND ACETAMINOPHEN 5; 325 MG/1; MG/1
1 TABLET ORAL EVERY 6 HOURS PRN
Qty: 20 TABLET | Refills: 0 | Status: SHIPPED | OUTPATIENT
Start: 2025-07-01 | End: 2025-07-08

## 2025-07-01 ASSESSMENT — LIFESTYLE VARIABLES
SKIP TO QUESTIONS 9-10: 1
AUDIT TOTAL SCORE: 4
HAS A RELATIVE, FRIEND, DOCTOR, OR ANOTHER HEALTH PROFESSIONAL EXPRESSED CONCERN ABOUT YOUR DRINKING OR SUGGESTED YOU CUT DOWN: NO
AUDIT-C TOTAL SCORE: 4
HOW OFTEN DO YOU HAVE SIX OR MORE DRINKS ON ONE OCCASION: NEVER
HOW MANY STANDARD DRINKS CONTAINING ALCOHOL DO YOU HAVE ON A TYPICAL DAY: 1 OR 2
HAVE YOU OR SOMEONE ELSE BEEN INJURED AS A RESULT OF YOUR DRINKING: NO
HOW OFTEN DO YOU HAVE A DRINK CONTAINING ALCOHOL: 4 OR MORE TIMES A WEEK

## 2025-07-01 ASSESSMENT — PATIENT HEALTH QUESTIONNAIRE - PHQ9
SUM OF ALL RESPONSES TO PHQ9 QUESTIONS 1 AND 2: 0
1. LITTLE INTEREST OR PLEASURE IN DOING THINGS: NOT AT ALL
2. FEELING DOWN, DEPRESSED OR HOPELESS: NOT AT ALL

## 2025-07-01 NOTE — PROGRESS NOTES
Assessment & Plan  Peripheral Artery Disease (PAD)  Exercise encouraged to promote collateral circulation. Pain during exercise expected; resting pain concerning.  - Encourage continued exercise to promote collateral circulation.  - Advised about not to climb on ladders.  - Educate on exercise's role in improving circulation.    Chronic Pain  Agreed to prescribe limited Percocet for pain management, used sparingly since last prescription.  OARRS report was reviewed.  Consent was signed.  - Prescribe 20 tablets of Percocet.  - Send prescription to [facility] pharmacy.    Patient's chronic ischemic heart disease is stable and he follows up with the [facility].  His blood pressure is borderline elevated but stable at this time and will continue the current management.    His cholesterol is well-controlled and he will continue the statin.    He has chronic atrial fibrillation and he is on anticoagulation which he tolerates well and no evidence of acute bleeding.    Patient has slightly elevated hemoglobin A1c and the importance of diet control was discussed.    Fall Risk  At risk for falls, especially when using ladders. Emphasized fall prevention to avoid nursing home admission.  - Advise against using ladders.  - Encourage seeking assistance from family for high-reaching tasks.  - Educate on fall prevention importance.    Leukocytosis  Mild leukocytosis with WBC count of 11, not concerning without steroid use. No action required.    General Health Maintenance  Discussed importance of maintaining muscle mass and potential muscle loss with weight loss. Encouraged resistance exercises.  - Encourage resistance exercises to maintain muscle mass.  - Continue vitamin D, B, and C supplements.       Assessment/Plan     Problem List Items Addressed This Visit       Amputation below knee (Multi)    Atrial fibrillation (Multi)    Relevant Orders    CBC and Auto Differential    Comprehensive Metabolic Panel    Chronic lower  limb pain    Relevant Medications    oxyCODONE-acetaminophen (Percocet) 5-325 mg tablet    Chronic obstructive pulmonary disease (Multi)    Claudication of right lower extremity    Relevant Medications    oxyCODONE-acetaminophen (Percocet) 5-325 mg tablet    Arteriosclerosis of coronary artery    Dyslipidemia    Relevant Orders    Comprehensive Metabolic Panel    Elevated hemoglobin A1c    Essential hypertension, benign    Relevant Orders    CBC and Auto Differential    Comprehensive Metabolic Panel    Ischemic dilated cardiomyopathy (Multi) - Primary    Anticoagulated       Subjective 0    Patient ID: Bautista Mendoza is a 85 y.o. male who presents for Follow-up.    History of Present Illness  Bautista Mendoza is an 85 year old male who presents for a routine follow-up.    He experiences difficulty getting off the ground and rising from a lying position, noting muscle weakness and concerns about muscle loss, which impacts his physical activity. He exercises by going up and down steps but experiences pain due to poor circulation in his legs. He wants to remain active despite concerns about falling and mentions difficulty accessing items in his garage due to limited arm strength and space constraints.    He has a history of cardiovascular issues and previous vascular surgery on his leg. He has not seen a cardiologist recently and is unsure about his next appointment. He recalls that his previous vascular doctor, Dr. Kirby, retired, and he has not contacted a new one. He mentions Dr. Conner Jacobs as a potential current cardiologist.    He reports a slightly elevated white blood cell count of 11, with a normal range up to 10.8, but he has not been on any steroids. He stopped smoking a while ago and is currently taking vitamin D, B, and C supplements. He takes a blood thinner and has not taken Percocet regularly since November. He requests a refill of Percocet, stating he uses it occasionally for pain management.        Surgical History[1]     ROS    Family History[2]   Social History     Socioeconomic History   • Marital status:      Spouse name: Not on file   • Number of children: Not on file   • Years of education: Not on file   • Highest education level: Not on file   Occupational History   • Not on file   Tobacco Use   • Smoking status: Former     Current packs/day: 0.00     Average packs/day: 0.3 packs/day for 45.0 years (11.3 ttl pk-yrs)     Types: Cigarettes     Start date:      Quit date:      Years since quittin.5   • Smokeless tobacco: Never   Vaping Use   • Vaping status: Never Used   Substance and Sexual Activity   • Alcohol use: Yes     Comment: occassion   • Drug use: Never   • Sexual activity: Not on file   Other Topics Concern   • Not on file   Social History Narrative   • Not on file     Social Drivers of Health     Financial Resource Strain: Low Risk  (2023)    Received from [place]    Overall Financial Resource Strain (CARDIA)    • Difficulty of Paying Living Expenses: Not hard at all   Food Insecurity: No Food Insecurity (2023)    Received from [place]    Hunger Vital Sign    • Worried About Running Out of Food in the Last Year: Never true    • Ran Out of Food in the Last Year: Never true   Transportation Needs: No Transportation Needs (2023)    [facility] from [place]    [facility]    • Lack of Transportation (Medical): No    • Lack of Transportation (Non-Medical): No   Physical Activity: Not on file   Stress: Not on file   Social Connections: Not on file   Intimate Partner Violence: Not on file   Housing Stability: Unknown (2023)    Received from [place]    Housing Stability Vital Sign    • Unable to Pay for Housing in the Last Year: No    • Number of Places Lived in the Last Year: Not on file    • Unstable Housing in the Last Year: No      Hydromorphone   Current Rx[3]       Objective     Vitals:    25 1256    BP: (!) 144/98   Pulse: 61        Physical Exam    Normal-built, well-nourished with no apparent distress. Alert oriented  Skin: Normal turgor. No rash.  Has slightly decreased hearing.  Head: Normocephalic, atraumatic.  Eyes: Pupils are equal, round,.  No pallor of conjunctivae.  Mucous membranes are moist  Neck: Supple. No JVD.  No thyromegaly. No cervical lymphadenopathy.    Left neck scar healed. Right neck scar healing.   No clubbing, has significant peripheral osteoarthritis noted,   Chest: Vesicular breathing Bilaterally moderate air entry and rare wheezing.  No crackles.  Heart: Irregular rate and rhythm controlled. S1, S2 positive.   Abdomen: Soft and nontender. Bowel sounds are positive. No organomegaly.  Extremities: Right leg no pedal pitting edema.    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  patient is able to bear weight on the left leg with amputation and the prosthesis.  Right leg pedal pulses are difficult to palpate but no evidence of acute ischemia  Neuro Exam: No focal signs. Able to walk with the prostheses independently.        Problem List Items Addressed This Visit       Amputation below knee (Multi)    Atrial fibrillation (Multi)    Relevant Orders    CBC and Auto Differential    Comprehensive Metabolic Panel    Chronic lower limb pain    Relevant Medications    oxyCODONE-acetaminophen (Percocet) 5-325 mg tablet    Chronic obstructive pulmonary disease (Multi)    Claudication of right lower extremity    Relevant Medications    oxyCODONE-acetaminophen (Percocet) 5-325 mg tablet    Arteriosclerosis of coronary artery    Dyslipidemia    Relevant Orders    Comprehensive Metabolic Panel    Elevated hemoglobin A1c    Essential hypertension, benign    Relevant Orders    CBC and Auto Differential    Comprehensive Metabolic Panel    Ischemic dilated cardiomyopathy (Multi) - Primary    Anticoagulated        Orders Placed This Encounter   Procedures   • CBC and Auto Differential      Standing Status:   Future     Number of Occurrences:   1     Expected Date:   7/1/2025     Expiration Date:   7/1/2026     Release result to [facility]:   Immediate   • Comprehensive Metabolic Panel     Standing Status:   Future     Number of Occurrences:   1     Expected Date:   7/1/2025     Expiration Date:   7/1/2026     Release result to [facility]:   Immediate        @LASTLAB[<insert lab base name>:<insert number of results or*for all>,<repeat format for multiple labs>]@  Lab Results   Component Value Date    CHOL 106 06/27/2025    LDLCALC 39 06/27/2025    CHHDL 2.0 06/27/2025           Latest Reference Range & Units 06/27/25 07:48 06/27/25 07:51   GLUCOSE 65 - 99 mg/dL 89    SODIUM 135 - 146 mmol/L 137    POTASSIUM 3.5 - 5.3 mmol/L 5.3    CHLORIDE 98 - 110 mmol/L 100    CARBON DIOXIDE 20 - 32 mmol/L 29    ELECTROLYTE BALANCE 7 - 17 mmol/L (calc) 8    UREA NITROGEN (BUN) 7 - 25 mg/dL 19    CREATININE 0.70 - 1.22 mg/dL 0.93    EGFR > OR = 60 mL/min/1.73m2 81    CALCIUM 8.6 - 10.3 mg/dL 8.7    ALBUMIN 3.6 - 5.1 g/dL 3.9    PROTEIN, TOTAL 6.1 - 8.1 g/dL 6.4    ALKALINE PHOSPHATASE 35 - 144 U/L 62    ALT 9 - 46 U/L 14    AST 10 - 35 U/L 15    BILIRUBIN, TOTAL 0.2 - 1.2 mg/dL 0.6    CHOLESTEROL, TOTAL <200 mg/dL 106    HDL CHOLESTEROL > OR = 40 mg/dL 53    CHOL/HDLC RATIO <5.0 (calc) 2.0    LDL-CHOLESTEROL mg/dL (calc) 39    TRIGLYCERIDES <150 mg/dL 59    NON HDL CHOLESTEROL <130 mg/dL (calc) 53    HEMOGLOBIN A1c <5.7 %  5.6   eAG (mg/dL) mg/dL  114   eAG (mmol/L) mmol/L  6.3   TSH 0.40 - 4.50 mIU/L 2.02    VITAMIN D,25-OH,TOTAL,IA 30 - 100 ng/mL  35   WHITE BLOOD CELL COUNT 3.8 - 10.8 Thousand/uL 11.0 (H)    RED BLOOD CELL COUNT 4.20 - 5.80 Million/uL 4.13 (L)    HEMOGLOBIN 13.2 - 17.1 g/dL 13.6    HEMATOCRIT 38.5 - 50.0 % 40.0    MCV 80.0 - 100.0 fL 96.9    MCH 27.0 - 33.0 pg 32.9    MCHC 32.0 - 36.0 g/dL 34.0    RDW 11.0 - 15.0 % 12.5    PLATELET COUNT 140 - 400 Thousand/uL 251    MPV 7.5 - 12.5 fL 10.0    ABSOLUTE  NEUTROPHILS 1,500 - 7,800 cells/uL 6,358    ABSOLUTE LYMPHOCYTES 850 - 3,900 cells/uL 3,168    ABSOLUTE MONOCYTES 200 - 950 cells/uL 1,254 (H)    ABSOLUTE EOSINOPHILS 15 - 500 cells/uL 154    ABSOLUTE BASOPHILS 0 - 200 cells/uL 66    NEUTROPHILS % 57.8    LYMPHOCYTES % 28.8    MONOCYTES % 11.4    EOSINOPHILS % 1.4    BASOPHILS % 0.6    (H): Data is abnormally high  (L): Data is abnormally low         This medical note was created with the assistance of artificial intelligence (AI) for documentation purposes. The content has been reviewed and confirmed by the healthcare provider for accuracy and completeness. Patient consented to the use of audio recording and use of AI during their visit.              [1]  Past Surgical History:  Procedure Laterality Date   • CAROTID ARTERY ANGIOPLASTY Bilateral    • CAROTID ENDARTERECTOMY Right    • OTHER SURGICAL HISTORY  05/09/2019    Lower extremity amputation below knee   • OTHER SURGICAL HISTORY  05/09/2019    Lower extremity amputation   • OTHER SURGICAL HISTORY  05/09/2019    Hip replacement partial   • OTHER SURGICAL HISTORY  08/03/2022    Colonoscopy   • OTHER SURGICAL HISTORY  11/11/2021    Lower extremity amputation above knee   • OTHER SURGICAL HISTORY  11/11/2021    Hernia repair   • OTHER SURGICAL HISTORY  11/11/2021    Arterial angioplasty of lower extremity   • OTHER SURGICAL HISTORY  11/11/2021    Surgery   [2]  Family History  Problem Relation Name Age of Onset   • Cancer Mother     [3]  Current Outpatient Medications   Medication Sig Dispense Refill   • amiodarone (Pacerone) 200 mg tablet Take 1 tablet (200 mg) by mouth once daily.     • aspirin 81 mg EC tablet Take 1 tablet (81 mg) by mouth once daily.     • atorvastatin (Lipitor) 40 mg tablet TAKE 1 TABLET BY MOUTH AT  BEDTIME 90 tablet 1   • cholecalciferol (Vitamin D-3) 50 MCG (2000 UT) tablet Take 1 tablet (50 mcg) by mouth once daily.     • docusate sodium (Colace) 100 mg capsule Take 1 capsule (100 mg) by  mouth 2 times a day. 180 capsule 1   • ferrous sulfate (FeroSuL) 325 mg (65 mg elemental) tablet Take 1 tablet by mouth once daily. as directed 90 tablet 1   • fluticasone propion-salmeteroL (Advair Diskus) 250-50 mcg/dose diskus inhaler Inhale 1 puff every 12 hours. 180 each 1   • lisinopril 20 mg tablet Take 1 tablet (20 mg) by mouth once daily at bedtime. 90 tablet 1   • metoprolol tartrate (Lopressor) 25 mg tablet Take 0.5 tablets (12.5 mg) by mouth once daily in the morning. 360 tablet 3   • Xarelto 20 mg tablet TAKE 1 TABLET BY MOUTH ONCE  DAILY IN THE EVENING 90 tablet 1   • oxyCODONE-acetaminophen (Percocet) 5-325 mg tablet Take 1 tablet by mouth every 6 hours if needed for severe pain (7 - 10) for up to 7 days. 20 tablet 0     No current facility-administered medications for this visit.

## 2025-07-01 NOTE — TELEPHONE ENCOUNTER
----- Message from Dewey Higgins sent at 6/30/2025 12:29 PM EDT -----  Results are acceptable.  We will review the results in detail during office follow-up and please advise patient to keep the follow-up appointment as scheduled.   ----- Message -----  From: [facility] Results In  Sent: 6/27/2025  11:15 PM EDT  To: Dewey Higgins MD

## 2025-08-04 ENCOUNTER — TELEPHONE (OUTPATIENT)
Dept: PRIMARY CARE | Facility: CLINIC | Age: 85
End: 2025-08-04
Payer: MEDICARE

## 2025-08-12 ENCOUNTER — APPOINTMENT (OUTPATIENT)
Dept: PRIMARY CARE | Facility: CLINIC | Age: 85
End: 2025-08-12
Payer: MEDICARE

## 2025-08-12 VITALS
HEIGHT: 69 IN | HEART RATE: 67 BPM | SYSTOLIC BLOOD PRESSURE: 155 MMHG | BODY MASS INDEX: 27.13 KG/M2 | DIASTOLIC BLOOD PRESSURE: 105 MMHG | WEIGHT: 183.2 LBS

## 2025-08-12 DIAGNOSIS — J42 CHRONIC BRONCHITIS, UNSPECIFIED CHRONIC BRONCHITIS TYPE (MULTI): ICD-10-CM

## 2025-08-12 DIAGNOSIS — S88.112S BELOW-KNEE AMPUTATION OF LEFT LOWER EXTREMITY, SEQUELA: Primary | ICD-10-CM

## 2025-08-12 DIAGNOSIS — G89.18 POST-OPERATIVE PAIN: ICD-10-CM

## 2025-08-12 DIAGNOSIS — I48.0 PAROXYSMAL ATRIAL FIBRILLATION (MULTI): ICD-10-CM

## 2025-08-12 PROCEDURE — 1160F RVW MEDS BY RX/DR IN RCRD: CPT | Performed by: INTERNAL MEDICINE

## 2025-08-12 PROCEDURE — 3077F SYST BP >= 140 MM HG: CPT | Performed by: INTERNAL MEDICINE

## 2025-08-12 PROCEDURE — 3080F DIAST BP >= 90 MM HG: CPT | Performed by: INTERNAL MEDICINE

## 2025-08-12 PROCEDURE — G2211 COMPLEX E/M VISIT ADD ON: HCPCS | Performed by: INTERNAL MEDICINE

## 2025-08-12 PROCEDURE — 1159F MED LIST DOCD IN RCRD: CPT | Performed by: INTERNAL MEDICINE

## 2025-08-12 PROCEDURE — 99214 OFFICE O/P EST MOD 30 MIN: CPT | Performed by: INTERNAL MEDICINE

## 2025-08-12 RX ORDER — OXYCODONE AND ACETAMINOPHEN 5; 325 MG/1; MG/1
1 TABLET ORAL EVERY 8 HOURS PRN
Qty: 20 TABLET | Refills: 0 | Status: SHIPPED | OUTPATIENT
Start: 2025-08-12 | End: 2025-08-19

## 2025-10-02 ENCOUNTER — APPOINTMENT (OUTPATIENT)
Dept: PRIMARY CARE | Facility: CLINIC | Age: 85
End: 2025-10-02
Payer: MEDICARE

## 2026-02-25 ENCOUNTER — APPOINTMENT (OUTPATIENT)
Dept: UROLOGY | Facility: CLINIC | Age: 86
End: 2026-02-25
Payer: MEDICARE